# Patient Record
Sex: FEMALE | Race: BLACK OR AFRICAN AMERICAN | NOT HISPANIC OR LATINO | URBAN - METROPOLITAN AREA
[De-identification: names, ages, dates, MRNs, and addresses within clinical notes are randomized per-mention and may not be internally consistent; named-entity substitution may affect disease eponyms.]

---

## 2021-09-12 ENCOUNTER — EMERGENCY (EMERGENCY)
Facility: HOSPITAL | Age: 51
LOS: 1 days | Discharge: HOME | End: 2021-09-12
Attending: EMERGENCY MEDICINE | Admitting: EMERGENCY MEDICINE
Payer: COMMERCIAL

## 2021-09-12 VITALS
SYSTOLIC BLOOD PRESSURE: 114 MMHG | OXYGEN SATURATION: 100 % | HEART RATE: 79 BPM | TEMPERATURE: 98 F | RESPIRATION RATE: 18 BRPM | DIASTOLIC BLOOD PRESSURE: 77 MMHG

## 2021-09-12 VITALS
RESPIRATION RATE: 16 BRPM | DIASTOLIC BLOOD PRESSURE: 67 MMHG | HEART RATE: 70 BPM | OXYGEN SATURATION: 100 % | SYSTOLIC BLOOD PRESSURE: 112 MMHG | TEMPERATURE: 98 F

## 2021-09-12 LAB
ALBUMIN SERPL ELPH-MCNC: 4.6 G/DL — SIGNIFICANT CHANGE UP (ref 3.3–5)
ALP SERPL-CCNC: 78 U/L — SIGNIFICANT CHANGE UP (ref 40–120)
ALT FLD-CCNC: 17 U/L — SIGNIFICANT CHANGE UP (ref 4–33)
ANION GAP SERPL CALC-SCNC: 10 MMOL/L — SIGNIFICANT CHANGE UP (ref 7–14)
APPEARANCE UR: CLEAR — SIGNIFICANT CHANGE UP
AST SERPL-CCNC: 18 U/L — SIGNIFICANT CHANGE UP (ref 4–32)
BACTERIA # UR AUTO: NEGATIVE — SIGNIFICANT CHANGE UP
BASOPHILS # BLD AUTO: 0.02 K/UL — SIGNIFICANT CHANGE UP (ref 0–0.2)
BASOPHILS NFR BLD AUTO: 0.3 % — SIGNIFICANT CHANGE UP (ref 0–2)
BILIRUB SERPL-MCNC: 1.2 MG/DL — SIGNIFICANT CHANGE UP (ref 0.2–1.2)
BILIRUB UR-MCNC: NEGATIVE — SIGNIFICANT CHANGE UP
BUN SERPL-MCNC: 15 MG/DL — SIGNIFICANT CHANGE UP (ref 7–23)
CALCIUM SERPL-MCNC: 9.1 MG/DL — SIGNIFICANT CHANGE UP (ref 8.4–10.5)
CHLORIDE SERPL-SCNC: 101 MMOL/L — SIGNIFICANT CHANGE UP (ref 98–107)
CO2 SERPL-SCNC: 25 MMOL/L — SIGNIFICANT CHANGE UP (ref 22–31)
COLOR SPEC: SIGNIFICANT CHANGE UP
CREAT SERPL-MCNC: 0.52 MG/DL — SIGNIFICANT CHANGE UP (ref 0.5–1.3)
DIFF PNL FLD: NEGATIVE — SIGNIFICANT CHANGE UP
EOSINOPHIL # BLD AUTO: 0 K/UL — SIGNIFICANT CHANGE UP (ref 0–0.5)
EOSINOPHIL NFR BLD AUTO: 0 % — SIGNIFICANT CHANGE UP (ref 0–6)
GLUCOSE SERPL-MCNC: 124 MG/DL — HIGH (ref 70–99)
GLUCOSE UR QL: NEGATIVE — SIGNIFICANT CHANGE UP
HCT VFR BLD CALC: 37.9 % — SIGNIFICANT CHANGE UP (ref 34.5–45)
HGB BLD-MCNC: 13.1 G/DL — SIGNIFICANT CHANGE UP (ref 11.5–15.5)
IANC: 5.33 K/UL — SIGNIFICANT CHANGE UP (ref 1.5–8.5)
IMM GRANULOCYTES NFR BLD AUTO: 0.3 % — SIGNIFICANT CHANGE UP (ref 0–1.5)
KETONES UR-MCNC: ABNORMAL
LEUKOCYTE ESTERASE UR-ACNC: NEGATIVE — SIGNIFICANT CHANGE UP
LYMPHOCYTES # BLD AUTO: 0.79 K/UL — LOW (ref 1–3.3)
LYMPHOCYTES # BLD AUTO: 12.2 % — LOW (ref 13–44)
MCHC RBC-ENTMCNC: 30.8 PG — SIGNIFICANT CHANGE UP (ref 27–34)
MCHC RBC-ENTMCNC: 34.6 GM/DL — SIGNIFICANT CHANGE UP (ref 32–36)
MCV RBC AUTO: 89 FL — SIGNIFICANT CHANGE UP (ref 80–100)
MONOCYTES # BLD AUTO: 0.32 K/UL — SIGNIFICANT CHANGE UP (ref 0–0.9)
MONOCYTES NFR BLD AUTO: 4.9 % — SIGNIFICANT CHANGE UP (ref 2–14)
NEUTROPHILS # BLD AUTO: 5.33 K/UL — SIGNIFICANT CHANGE UP (ref 1.8–7.4)
NEUTROPHILS NFR BLD AUTO: 82.3 % — HIGH (ref 43–77)
NITRITE UR-MCNC: NEGATIVE — SIGNIFICANT CHANGE UP
NRBC # BLD: 0 /100 WBCS — SIGNIFICANT CHANGE UP
NRBC # FLD: 0 K/UL — SIGNIFICANT CHANGE UP
PH UR: 6.5 — SIGNIFICANT CHANGE UP (ref 5–8)
PLATELET # BLD AUTO: 187 K/UL — SIGNIFICANT CHANGE UP (ref 150–400)
POTASSIUM SERPL-MCNC: 3.7 MMOL/L — SIGNIFICANT CHANGE UP (ref 3.5–5.3)
POTASSIUM SERPL-SCNC: 3.7 MMOL/L — SIGNIFICANT CHANGE UP (ref 3.5–5.3)
PROT SERPL-MCNC: 7.4 G/DL — SIGNIFICANT CHANGE UP (ref 6–8.3)
PROT UR-MCNC: ABNORMAL
RBC # BLD: 4.26 M/UL — SIGNIFICANT CHANGE UP (ref 3.8–5.2)
RBC # FLD: 12.3 % — SIGNIFICANT CHANGE UP (ref 10.3–14.5)
RBC CASTS # UR COMP ASSIST: 2 /HPF — SIGNIFICANT CHANGE UP (ref 0–4)
SODIUM SERPL-SCNC: 136 MMOL/L — SIGNIFICANT CHANGE UP (ref 135–145)
SP GR SPEC: 1.02 — SIGNIFICANT CHANGE UP (ref 1–1.05)
UROBILINOGEN FLD QL: SIGNIFICANT CHANGE UP
WBC # BLD: 6.48 K/UL — SIGNIFICANT CHANGE UP (ref 3.8–10.5)
WBC # FLD AUTO: 6.48 K/UL — SIGNIFICANT CHANGE UP (ref 3.8–10.5)
WBC UR QL: 2 /HPF — SIGNIFICANT CHANGE UP (ref 0–5)

## 2021-09-12 PROCEDURE — 99284 EMERGENCY DEPT VISIT MOD MDM: CPT

## 2021-09-12 RX ORDER — MECLIZINE HCL 12.5 MG
25 TABLET ORAL ONCE
Refills: 0 | Status: COMPLETED | OUTPATIENT
Start: 2021-09-12 | End: 2021-09-12

## 2021-09-12 RX ORDER — SODIUM CHLORIDE 9 MG/ML
1000 INJECTION INTRAMUSCULAR; INTRAVENOUS; SUBCUTANEOUS ONCE
Refills: 0 | Status: COMPLETED | OUTPATIENT
Start: 2021-09-12 | End: 2021-09-12

## 2021-09-12 RX ORDER — MECLIZINE HCL 12.5 MG
1 TABLET ORAL
Qty: 21 | Refills: 0
Start: 2021-09-12 | End: 2021-09-18

## 2021-09-12 RX ADMIN — Medication 25 MILLIGRAM(S): at 19:02

## 2021-09-12 RX ADMIN — SODIUM CHLORIDE 2000 MILLILITER(S): 9 INJECTION INTRAMUSCULAR; INTRAVENOUS; SUBCUTANEOUS at 19:02

## 2021-09-12 RX ADMIN — Medication 25 MILLIGRAM(S): at 21:19

## 2021-09-12 NOTE — ED ADULT TRIAGE NOTE - CHIEF COMPLAINT QUOTE
pt coming with dizziness + nausea, started pt stated at approx 2Pm, pt denies weakness to upper or lower extrem. pt coming with dizziness + nausea, started pt stated at approx 2Pm, pt denies weakness to upper or lower extrem.     Fs= 112 by 12PM

## 2021-09-12 NOTE — ED PROVIDER NOTE - CLINICAL SUMMARY MEDICAL DECISION MAKING FREE TEXT BOX
pt p/w recurrent episode of room spinning dizziness with no focal neuro deficits. labs wnl, vss, neg perc criteria. no risk factors. pt hd stable, significant improvement for room spinning sensation. ekg non significant.

## 2021-09-12 NOTE — ED PROVIDER NOTE - PATIENT PORTAL LINK FT
You can access the FollowMyHealth Patient Portal offered by [facility] by registering at the following website: [email or web address]. By joining [employer]’s FollowMyHealth portal, you will also be able to view your health information using other applications (apps) compatible with our system.

## 2021-09-12 NOTE — ED PROVIDER NOTE - ENMT, MLM
Airway patent, Nasal mucosa clear. Mouth with normal mucosa. Throat has no vesicles, no oropharyngeal exudates and uvula is midline. wnl heel to shin, normal heel to shin.

## 2021-09-12 NOTE — ED PROVIDER NOTE - OBJECTIVE STATEMENT
50 y/o f with no pmhx p/w worsening dizziness, worse with movt of head, complains of room spinning dizziness with no focal neuro deficits. states has had similar symptoms in the past but not for multiple years.

## 2021-10-20 ENCOUNTER — APPOINTMENT (OUTPATIENT)
Dept: OTOLARYNGOLOGY | Facility: CLINIC | Age: 51
End: 2021-10-20
Payer: COMMERCIAL

## 2021-10-20 VITALS
BODY MASS INDEX: 27.7 KG/M2 | WEIGHT: 187 LBS | DIASTOLIC BLOOD PRESSURE: 65 MMHG | HEIGHT: 69 IN | TEMPERATURE: 97.5 F | HEART RATE: 79 BPM | SYSTOLIC BLOOD PRESSURE: 108 MMHG

## 2021-10-20 DIAGNOSIS — Z01.10 ENCOUNTER FOR EXAMINATION OF EARS AND HEARING W/OUT ABNORMAL FINDINGS: ICD-10-CM

## 2021-10-20 DIAGNOSIS — R42 DIZZINESS AND GIDDINESS: ICD-10-CM

## 2021-10-20 PROCEDURE — 92567 TYMPANOMETRY: CPT

## 2021-10-20 PROCEDURE — 92557 COMPREHENSIVE HEARING TEST: CPT

## 2021-10-20 PROCEDURE — 99203 OFFICE O/P NEW LOW 30 MIN: CPT | Mod: 25

## 2021-10-20 NOTE — ASSESSMENT
[FreeTextEntry1] : Patient had one vertigo episode 4 weeks ago, today she is feeling well \par \par Vertigo:\par -now resolved \par -Hearing Test performed to evaluate the extent of hearing loss and to explain pt's symptoms=wnl\par \par \par f/u prn

## 2021-10-20 NOTE — DATA REVIEWED
[de-identified] : Hearing Test performed to evaluate the extent of hearing loss and  to explain pt's symptoms\par today's hearing test was personally reviewed and revealed\par Hearing sensitivity is within normal limits, bilaterally\par

## 2021-10-20 NOTE — HISTORY OF PRESENT ILLNESS
[No] : patient does not have a  history of radiation therapy [de-identified] : 50 yo female\par Patient complains she had 1 episode of vertigo 4 weeks ago. She woke up one morning with the room spinning, went to the ED,was told she has vertigo and was discharged home. Today she is feeling well. States she had vertigo once 8 years ago. Otherwise doing well. Pt has no ear pain, ear drainage, hearing loss, tinnitus, nasal congestion, nasal discharge, epistaxis, sinus infections, facial pain, facial pressure, throat pain, dysphagia or fevers\par \par  [Vertigo] : vertigo [Dizziness] : dizziness [Hearing Loss] : no hearing loss [Eustachian Tube Dysfunction] : no eustachian tube dysfunction [Cholesteatoma] : no cholesteatoma [Early Onset Hearing Loss] : no early onset hearing loss [Stroke] : no stroke [Allergic Rhinitis] : no allergic rhinitis [Adenoidectomy] : no adenoidectomy [Allergies] : no allergies [Asthma] : no asthma [Hyperthyroidism] : no hyperthyroidism [Sialadenitis] : no sialadenitis [Hodgkin Disease] : no hodgkin disease [None] : No risk factors have been identified. [Graves Disease] : no graves disease [Thyroid Cancer] : no thyroid cancer

## 2021-10-20 NOTE — PHYSICAL EXAM
[Midline] : trachea located in midline position [Normal] : no rashes [Hearing Loss Right Only] : normal [Hearing Loss Left Only] : normal [Nystagmus] : ~T no ~M nystagmus was seen [Fukuda Step Test] : Fukuda Step Test was Negative [Robyn-Halljodyke] : [place]-Hallpike: Negative

## 2021-10-20 NOTE — END OF VISIT
[FreeTextEntry3] : I personally saw and examined GEOVANNI CALVO in detail. I spoke to TOMMY Street regarding the assessment and plan of care. I reviewed the above assessment and plan of care, and agree. I have made changes in changes in the body of the note where appropriate.I personally reviewed the HPI, PMH, FH, SH, ROS and medications/allergies. I have spoken to TOMMY Street regarding the history and have personally determined the assessment and plan of care, and documented this myself. I was present and participated in all key portions of the encounter and all procedures noted above. I have made changes in the body of the note where appropriate.\par \par Attesting Faculty: See Attending Signature Below \par \par \par  [Time Spent: ___ minutes] : I have spent [unfilled] minutes of time on the encounter.

## 2023-03-15 ENCOUNTER — EMERGENCY (EMERGENCY)
Facility: HOSPITAL | Age: 53
LOS: 1 days | Discharge: HOME | End: 2023-03-15
Attending: STUDENT IN AN ORGANIZED HEALTH CARE EDUCATION/TRAINING PROGRAM | Admitting: STUDENT IN AN ORGANIZED HEALTH CARE EDUCATION/TRAINING PROGRAM
Payer: COMMERCIAL

## 2023-03-15 VITALS
RESPIRATION RATE: 16 BRPM | DIASTOLIC BLOOD PRESSURE: 76 MMHG | HEART RATE: 88 BPM | OXYGEN SATURATION: 100 % | SYSTOLIC BLOOD PRESSURE: 124 MMHG

## 2023-03-15 VITALS
DIASTOLIC BLOOD PRESSURE: 70 MMHG | SYSTOLIC BLOOD PRESSURE: 141 MMHG | RESPIRATION RATE: 16 BRPM | TEMPERATURE: 98 F | OXYGEN SATURATION: 100 % | HEART RATE: 66 BPM

## 2023-03-15 LAB
ALBUMIN SERPL ELPH-MCNC: 4.5 G/DL — SIGNIFICANT CHANGE UP (ref 3.3–5)
ALP SERPL-CCNC: 81 U/L — SIGNIFICANT CHANGE UP (ref 40–120)
ALT FLD-CCNC: 23 U/L — SIGNIFICANT CHANGE UP (ref 4–33)
ANION GAP SERPL CALC-SCNC: 10 MMOL/L — SIGNIFICANT CHANGE UP (ref 7–14)
APPEARANCE UR: CLEAR — SIGNIFICANT CHANGE UP
AST SERPL-CCNC: 19 U/L — SIGNIFICANT CHANGE UP (ref 4–32)
BASOPHILS # BLD AUTO: 0.02 K/UL — SIGNIFICANT CHANGE UP (ref 0–0.2)
BASOPHILS NFR BLD AUTO: 0.5 % — SIGNIFICANT CHANGE UP (ref 0–2)
BILIRUB SERPL-MCNC: 1 MG/DL — SIGNIFICANT CHANGE UP (ref 0.2–1.2)
BILIRUB UR-MCNC: NEGATIVE — SIGNIFICANT CHANGE UP
BUN SERPL-MCNC: 12 MG/DL — SIGNIFICANT CHANGE UP (ref 7–23)
CALCIUM SERPL-MCNC: 9.4 MG/DL — SIGNIFICANT CHANGE UP (ref 8.4–10.5)
CHLORIDE SERPL-SCNC: 105 MMOL/L — SIGNIFICANT CHANGE UP (ref 98–107)
CO2 SERPL-SCNC: 27 MMOL/L — SIGNIFICANT CHANGE UP (ref 22–31)
COLOR SPEC: COLORLESS — SIGNIFICANT CHANGE UP
CREAT SERPL-MCNC: 0.63 MG/DL — SIGNIFICANT CHANGE UP (ref 0.5–1.3)
DIFF PNL FLD: NEGATIVE — SIGNIFICANT CHANGE UP
EGFR: 107 ML/MIN/1.73M2 — SIGNIFICANT CHANGE UP
EOSINOPHIL # BLD AUTO: 0.05 K/UL — SIGNIFICANT CHANGE UP (ref 0–0.5)
EOSINOPHIL NFR BLD AUTO: 1.2 % — SIGNIFICANT CHANGE UP (ref 0–6)
FLUAV AG NPH QL: SIGNIFICANT CHANGE UP
FLUBV AG NPH QL: SIGNIFICANT CHANGE UP
GLUCOSE SERPL-MCNC: 130 MG/DL — HIGH (ref 70–99)
GLUCOSE UR QL: NEGATIVE — SIGNIFICANT CHANGE UP
HCT VFR BLD CALC: 37.8 % — SIGNIFICANT CHANGE UP (ref 34.5–45)
HGB BLD-MCNC: 12.9 G/DL — SIGNIFICANT CHANGE UP (ref 11.5–15.5)
IANC: 2.12 K/UL — SIGNIFICANT CHANGE UP (ref 1.8–7.4)
IMM GRANULOCYTES NFR BLD AUTO: 0.2 % — SIGNIFICANT CHANGE UP (ref 0–0.9)
KETONES UR-MCNC: NEGATIVE — SIGNIFICANT CHANGE UP
LEUKOCYTE ESTERASE UR-ACNC: NEGATIVE — SIGNIFICANT CHANGE UP
LYMPHOCYTES # BLD AUTO: 1.64 K/UL — SIGNIFICANT CHANGE UP (ref 1–3.3)
LYMPHOCYTES # BLD AUTO: 40.4 % — SIGNIFICANT CHANGE UP (ref 13–44)
MCHC RBC-ENTMCNC: 30.4 PG — SIGNIFICANT CHANGE UP (ref 27–34)
MCHC RBC-ENTMCNC: 34.1 GM/DL — SIGNIFICANT CHANGE UP (ref 32–36)
MCV RBC AUTO: 88.9 FL — SIGNIFICANT CHANGE UP (ref 80–100)
MONOCYTES # BLD AUTO: 0.22 K/UL — SIGNIFICANT CHANGE UP (ref 0–0.9)
MONOCYTES NFR BLD AUTO: 5.4 % — SIGNIFICANT CHANGE UP (ref 2–14)
NEUTROPHILS # BLD AUTO: 2.12 K/UL — SIGNIFICANT CHANGE UP (ref 1.8–7.4)
NEUTROPHILS NFR BLD AUTO: 52.3 % — SIGNIFICANT CHANGE UP (ref 43–77)
NITRITE UR-MCNC: NEGATIVE — SIGNIFICANT CHANGE UP
NRBC # BLD: 0 /100 WBCS — SIGNIFICANT CHANGE UP (ref 0–0)
NRBC # FLD: 0 K/UL — SIGNIFICANT CHANGE UP (ref 0–0)
PH UR: 6.5 — SIGNIFICANT CHANGE UP (ref 5–8)
PLATELET # BLD AUTO: 173 K/UL — SIGNIFICANT CHANGE UP (ref 150–400)
POTASSIUM SERPL-MCNC: 4.2 MMOL/L — SIGNIFICANT CHANGE UP (ref 3.5–5.3)
POTASSIUM SERPL-SCNC: 4.2 MMOL/L — SIGNIFICANT CHANGE UP (ref 3.5–5.3)
PROT SERPL-MCNC: 7.4 G/DL — SIGNIFICANT CHANGE UP (ref 6–8.3)
PROT UR-MCNC: NEGATIVE — SIGNIFICANT CHANGE UP
RBC # BLD: 4.25 M/UL — SIGNIFICANT CHANGE UP (ref 3.8–5.2)
RBC # FLD: 13.2 % — SIGNIFICANT CHANGE UP (ref 10.3–14.5)
RSV RNA NPH QL NAA+NON-PROBE: SIGNIFICANT CHANGE UP
SARS-COV-2 RNA SPEC QL NAA+PROBE: SIGNIFICANT CHANGE UP
SODIUM SERPL-SCNC: 142 MMOL/L — SIGNIFICANT CHANGE UP (ref 135–145)
SP GR SPEC: 1.01 — LOW (ref 1.01–1.05)
TROPONIN T, HIGH SENSITIVITY RESULT: <6 NG/L — SIGNIFICANT CHANGE UP
TROPONIN T, HIGH SENSITIVITY RESULT: <6 NG/L — SIGNIFICANT CHANGE UP
UROBILINOGEN FLD QL: SIGNIFICANT CHANGE UP
WBC # BLD: 4.06 K/UL — SIGNIFICANT CHANGE UP (ref 3.8–10.5)
WBC # FLD AUTO: 4.06 K/UL — SIGNIFICANT CHANGE UP (ref 3.8–10.5)

## 2023-03-15 PROCEDURE — 99285 EMERGENCY DEPT VISIT HI MDM: CPT

## 2023-03-15 PROCEDURE — 71046 X-RAY EXAM CHEST 2 VIEWS: CPT | Mod: 26

## 2023-03-15 NOTE — ED PROVIDER NOTE - NSFOLLOWUPINSTRUCTIONS_ED_ALL_ED_FT
Avoid any strenuous activity.  Rest, drink plenty fluids.  Follow-up with your PCP and cardiologist within 1 week.  Return to ER if your chest pain persists, you have shortness of breath, dizziness, or any other worsening or concerning symptoms.

## 2023-03-15 NOTE — ED PROVIDER NOTE - PROGRESS NOTE DETAILS
Labs, chest x-ray unremarkable.  Shared decision making done with patient and offered CDU stay for further work-up.  Patient would rather follow-up outpatient.  Discharge lounge assisting with cardiology follow-up appointment.  Patient's vital signs stable, well-appearing, has had no chest pain while in ED.  Will DC home.  Strict return precautions given.

## 2023-03-15 NOTE — ED PROVIDER NOTE - PATIENT PORTAL LINK FT
You can access the FollowMyHealth Patient Portal offered by [facility] by registering at the following website: [email or web address]. By joining [employer]’s FollowMyHealth portal, you will also be able to view your health information using other applications (apps) compatible with our system.

## 2023-03-15 NOTE — ED ADULT NURSE NOTE - OBJECTIVE STATEMENT
A&Ox4. ambulatory. c/o intermittent chest pain. NAD. pt denies SOB, chest pain, dizziness, weakness, urinary symptoms, HA, n/v/d, fevers, chills. respirations are even and un labored. 20g placed to LAC. EKG obtained. safety precautions maintained.

## 2023-03-15 NOTE — ED PROVIDER NOTE - OBJECTIVE STATEMENT
52-year-old female no PMHx presents to ED complaining of left-sided chest pain x2 episodes which started around 945 this AM.  Patient states was just sitting on her computer when the pain started.  States only lasted a few seconds.  States had another episode that was more mild since.  Patient states around a month and a half ago had similar pain however this a.m. it felt worse.  Patient describes pain as "pulling sensation in the left chest which goes towards the back."  Patient denies any SOB, dizziness, headache, diaphoresis, leg swelling, N/V, abdominal pain, sick contacts.  Patient has not had any recent cardiac work-up done. Currently patient is asymptomatic.

## 2023-03-15 NOTE — ED ADULT TRIAGE NOTE - CHIEF COMPLAINT QUOTE
Pt c/o intermittent L sided chest pain radiating to L shoulder starting this AM. Pt states pain was worse this morning. Pt denies shortness of breath. Denies any known cardiac history.

## 2023-03-15 NOTE — ED PROVIDER NOTE - CLINICAL SUMMARY MEDICAL DECISION MAKING FREE TEXT BOX
52-year-old female no PMHx presents to ED complaining of left-sided chest pain x2 episodes which started around 945 this AM.  Patient states was just sitting on her computer when the pain started.  States only lasted a few seconds.  States had another episode that was more mild since.  Patient states around a month and a half ago had similar pain however this a.m. it felt worse.  Patient describes pain as "pulling sensation in the left chest which goes towards the back."  Patient denies any SOB, dizziness, headache, diaphoresis, leg swelling, N/V, abdominal pain, sick contacts.  Patient has not had any recent cardiac work-up done. Currently patient is asymptomatic.  EKG NSR, VSS, pt well appearing, symptoms non exertional. r/o ACS will check labs, troponin, cxr, likely outpatient cardiology follow up.

## 2023-03-15 NOTE — ED PROVIDER NOTE - ATTENDING APP SHARED VISIT CONTRIBUTION OF CARE
Nile Britton DO:  patient seen and evaluated with the PA.  I was present for key portions of the History & Physical, and I agree with the Impression & Plan. 51 yo f no reported pmh, non smoker, pw CP.  PW  bedside provides collateral.  States today had sudden episode of left-sided chest pain, sharp, rating to left back and arm.  Transient, self resolved.  Had a second episode in a few moments.  Also endorses some episode 1 month prior.  Denies inciting event.  Nonexertional, nonpleuritic.  Denies recent travel, sick contacts.  Denies SOB, N/V, paresthesias, diaphoresis.  Reports family history of heart failure,. Denies history of cardiac work-up.  Patient arrives HDS, well-appearing.  EKG NSR, nonischemic.  Very low suspicion ACS.  Wells criteria 0.  Do not suspect further catastrophe.  Patient seems to be reliable and can likely follow-up outpatient.

## 2023-03-17 ENCOUNTER — LABORATORY RESULT (OUTPATIENT)
Age: 53
End: 2023-03-17

## 2023-03-17 ENCOUNTER — APPOINTMENT (OUTPATIENT)
Dept: CARDIOLOGY | Facility: CLINIC | Age: 53
End: 2023-03-17
Payer: COMMERCIAL

## 2023-03-17 VITALS
SYSTOLIC BLOOD PRESSURE: 123 MMHG | DIASTOLIC BLOOD PRESSURE: 73 MMHG | BODY MASS INDEX: 29.47 KG/M2 | HEIGHT: 69 IN | HEART RATE: 74 BPM | WEIGHT: 199 LBS | OXYGEN SATURATION: 97 %

## 2023-03-17 PROCEDURE — 93000 ELECTROCARDIOGRAM COMPLETE: CPT

## 2023-03-17 PROCEDURE — 99205 OFFICE O/P NEW HI 60 MIN: CPT | Mod: 25

## 2023-03-17 NOTE — HISTORY OF PRESENT ILLNESS
[FreeTextEntry1] : 52F with PMHx of pre-diabetes presenting for follow up after recent ED visit for chest pain. \par \par Plan: \par Exercise stress test to evaluate her symptoms with exertion. \par Lipid panel, HbA1c, and CAC for further risk stratification \par \par RTC in 1 month after stess test

## 2023-03-20 NOTE — ASSESSMENT
[FreeTextEntry1] : 52F with PMHx prediabetes - diet-controlled for evaluation of intermittent chain pain with exertion. \par \par 1. Chest pain - \par patient is sedentary, unclear if symptoms are associated with exertion \par treadmill exercise stress test for evaluation of symptoms with exercise. \par hx of pre-diabetes - not on statin- CAC for risk stratification and need for statin therapy \par \par 2. HLD: \par - obtain lipid panel \par \par

## 2023-03-20 NOTE — REASON FOR VISIT
[Symptom and Test Evaluation] : symptom and test evaluation [FreeTextEntry1] : 52F with PMHx prediabetes - diet-controlled for evaluation of intermittent chain pain with exertion. \par Walks for exercise, maintains HH diet\par No chronic conditions \par No mediations \par No family history history of CAD in first degree relatives. \par \par Echo (2016): normal LV and RV function. No significant valvular abnormalities. \par Exercise Stress Test: no ECG evidence of ischemia

## 2023-03-22 ENCOUNTER — OUTPATIENT (OUTPATIENT)
Dept: OUTPATIENT SERVICES | Facility: HOSPITAL | Age: 53
LOS: 1 days | End: 2023-03-22
Payer: COMMERCIAL

## 2023-03-22 ENCOUNTER — APPOINTMENT (OUTPATIENT)
Dept: CV DIAGNOSTICS | Facility: HOSPITAL | Age: 53
End: 2023-03-22

## 2023-03-22 DIAGNOSIS — R94.39 ABNORMAL RESULT OF OTHER CARDIOVASCULAR FUNCTION STUDY: ICD-10-CM

## 2023-03-22 DIAGNOSIS — R07.9 CHEST PAIN, UNSPECIFIED: ICD-10-CM

## 2023-03-22 PROCEDURE — 93016 CV STRESS TEST SUPVJ ONLY: CPT | Mod: GC

## 2023-03-22 PROCEDURE — 93018 CV STRESS TEST I&R ONLY: CPT | Mod: GC

## 2023-03-24 ENCOUNTER — APPOINTMENT (OUTPATIENT)
Dept: CT IMAGING | Facility: CLINIC | Age: 53
End: 2023-03-24

## 2023-04-17 ENCOUNTER — APPOINTMENT (OUTPATIENT)
Dept: CT IMAGING | Facility: CLINIC | Age: 53
End: 2023-04-17

## 2023-04-17 ENCOUNTER — APPOINTMENT (OUTPATIENT)
Dept: CT IMAGING | Facility: CLINIC | Age: 53
End: 2023-04-17
Payer: COMMERCIAL

## 2023-04-17 PROCEDURE — 75574 CT ANGIO HRT W/3D IMAGE: CPT

## 2023-04-25 NOTE — ED PROVIDER NOTE - CROS ED MARK PERT SYS NEG
a/w CP at rest  -plan for cardiac cath for ischemic work up  - JOSE MARIA protocol pre hydration: NS 250cc IV bolus x1   -Consent obtained for cardiac catheterization w/ coronary angiogram and possible stent placement, with possible sedation and analgia. Pt is competent, has capacity, and understands risks and benefits of procedure. Risks and benefits discussed. Risk discussed included, but not limited to MI, stroke, mortality, major bleeding, arrythmia, or infection. All questions answered
cherelle all pertinent systems negative

## 2023-04-26 ENCOUNTER — APPOINTMENT (OUTPATIENT)
Dept: CARDIOLOGY | Facility: CLINIC | Age: 53
End: 2023-04-26
Payer: COMMERCIAL

## 2023-04-26 VITALS
OXYGEN SATURATION: 97 % | HEIGHT: 69 IN | DIASTOLIC BLOOD PRESSURE: 79 MMHG | TEMPERATURE: 98.7 F | SYSTOLIC BLOOD PRESSURE: 127 MMHG | BODY MASS INDEX: 29.62 KG/M2 | WEIGHT: 200 LBS | HEART RATE: 87 BPM

## 2023-04-26 PROCEDURE — 99215 OFFICE O/P EST HI 40 MIN: CPT

## 2023-04-26 NOTE — REASON FOR VISIT
[FreeTextEntry1] : 52F with PMHx pre-diabetes - diet-controlled for evaluation of intermittent chain pain with exertion. \par Walks for exercise, maintains HH diet\par No chronic conditions \par No mediations \par No family history history of CAD in first degree relatives. \par ECG treadmill test: 9 mets, 1 mm horizontal/upsloping STD in multiple leads. \par CTCA: minimal plaque in RCA, CAC 0\par \par

## 2023-04-26 NOTE — ASSESSMENT
[FreeTextEntry1] : 52F minimal CAD, pre-diabetes - diet-controlled for evaluation of intermittent chain pain with exertion.\par \par 1. Primary prevention\par - Patient has minimal plaque in RCA, CAC 0. \par - Low event rate for MACE in this population with CAC 0. \par - Discussed results, patient not agreeable to start statin. LDL 87, HbA1c 5.8 plan is to focus on lifestyle interventions and weight loss to address the prediabetes. Will follow up in clinic in 6 months.  \par \par 2. Pre-diabetes: \par Plan to add exercise 150 min moderate intensity\par \par \par RTC in 6 month

## 2023-04-28 ENCOUNTER — APPOINTMENT (OUTPATIENT)
Dept: CARDIOLOGY | Facility: CLINIC | Age: 53
End: 2023-04-28

## 2023-07-29 ENCOUNTER — EMERGENCY (EMERGENCY)
Facility: HOSPITAL | Age: 53
LOS: 1 days | Discharge: HOME | End: 2023-07-29
Attending: EMERGENCY MEDICINE | Admitting: EMERGENCY MEDICINE
Payer: COMMERCIAL

## 2023-07-29 VITALS
HEART RATE: 81 BPM | DIASTOLIC BLOOD PRESSURE: 85 MMHG | SYSTOLIC BLOOD PRESSURE: 150 MMHG | RESPIRATION RATE: 16 BRPM | TEMPERATURE: 99 F | OXYGEN SATURATION: 100 %

## 2023-07-29 LAB
ALBUMIN SERPL ELPH-MCNC: 4.8 G/DL — SIGNIFICANT CHANGE UP (ref 3.3–5)
ALP SERPL-CCNC: 82 U/L — SIGNIFICANT CHANGE UP (ref 40–120)
ALT FLD-CCNC: 17 U/L — SIGNIFICANT CHANGE UP (ref 4–33)
ANION GAP SERPL CALC-SCNC: 18 MMOL/L — HIGH (ref 7–14)
AST SERPL-CCNC: 22 U/L — SIGNIFICANT CHANGE UP (ref 4–32)
BASOPHILS # BLD AUTO: 0.02 K/UL — SIGNIFICANT CHANGE UP (ref 0–0.2)
BASOPHILS NFR BLD AUTO: 0.4 % — SIGNIFICANT CHANGE UP (ref 0–2)
BILIRUB SERPL-MCNC: 1.4 MG/DL — HIGH (ref 0.2–1.2)
BUN SERPL-MCNC: 11 MG/DL — SIGNIFICANT CHANGE UP (ref 7–23)
CALCIUM SERPL-MCNC: 9.5 MG/DL — SIGNIFICANT CHANGE UP (ref 8.4–10.5)
CHLORIDE SERPL-SCNC: 102 MMOL/L — SIGNIFICANT CHANGE UP (ref 98–107)
CO2 SERPL-SCNC: 23 MMOL/L — SIGNIFICANT CHANGE UP (ref 22–31)
CREAT SERPL-MCNC: 0.74 MG/DL — SIGNIFICANT CHANGE UP (ref 0.5–1.3)
EGFR: 97 ML/MIN/1.73M2 — SIGNIFICANT CHANGE UP
EOSINOPHIL # BLD AUTO: 0.07 K/UL — SIGNIFICANT CHANGE UP (ref 0–0.5)
EOSINOPHIL NFR BLD AUTO: 1.5 % — SIGNIFICANT CHANGE UP (ref 0–6)
GLUCOSE SERPL-MCNC: 121 MG/DL — HIGH (ref 70–99)
HCT VFR BLD CALC: 38.2 % — SIGNIFICANT CHANGE UP (ref 34.5–45)
HGB BLD-MCNC: 13.1 G/DL — SIGNIFICANT CHANGE UP (ref 11.5–15.5)
IANC: 2.25 K/UL — SIGNIFICANT CHANGE UP (ref 1.8–7.4)
IMM GRANULOCYTES NFR BLD AUTO: 0.4 % — SIGNIFICANT CHANGE UP (ref 0–0.9)
LYMPHOCYTES # BLD AUTO: 2.05 K/UL — SIGNIFICANT CHANGE UP (ref 1–3.3)
LYMPHOCYTES # BLD AUTO: 43.1 % — SIGNIFICANT CHANGE UP (ref 13–44)
MCHC RBC-ENTMCNC: 30.1 PG — SIGNIFICANT CHANGE UP (ref 27–34)
MCHC RBC-ENTMCNC: 34.3 GM/DL — SIGNIFICANT CHANGE UP (ref 32–36)
MCV RBC AUTO: 87.8 FL — SIGNIFICANT CHANGE UP (ref 80–100)
MONOCYTES # BLD AUTO: 0.35 K/UL — SIGNIFICANT CHANGE UP (ref 0–0.9)
MONOCYTES NFR BLD AUTO: 7.4 % — SIGNIFICANT CHANGE UP (ref 2–14)
NEUTROPHILS # BLD AUTO: 2.25 K/UL — SIGNIFICANT CHANGE UP (ref 1.8–7.4)
NEUTROPHILS NFR BLD AUTO: 47.2 % — SIGNIFICANT CHANGE UP (ref 43–77)
NRBC # BLD: 0 /100 WBCS — SIGNIFICANT CHANGE UP (ref 0–0)
NRBC # FLD: 0 K/UL — SIGNIFICANT CHANGE UP (ref 0–0)
PLATELET # BLD AUTO: 238 K/UL — SIGNIFICANT CHANGE UP (ref 150–400)
POTASSIUM SERPL-MCNC: 3.5 MMOL/L — SIGNIFICANT CHANGE UP (ref 3.5–5.3)
POTASSIUM SERPL-SCNC: 3.5 MMOL/L — SIGNIFICANT CHANGE UP (ref 3.5–5.3)
PROT SERPL-MCNC: 8.2 G/DL — SIGNIFICANT CHANGE UP (ref 6–8.3)
RBC # BLD: 4.35 M/UL — SIGNIFICANT CHANGE UP (ref 3.8–5.2)
RBC # FLD: 12.5 % — SIGNIFICANT CHANGE UP (ref 10.3–14.5)
SODIUM SERPL-SCNC: 143 MMOL/L — SIGNIFICANT CHANGE UP (ref 135–145)
TSH SERPL-MCNC: 0.89 UIU/ML — SIGNIFICANT CHANGE UP (ref 0.27–4.2)
WBC # BLD: 4.76 K/UL — SIGNIFICANT CHANGE UP (ref 3.8–10.5)
WBC # FLD AUTO: 4.76 K/UL — SIGNIFICANT CHANGE UP (ref 3.8–10.5)

## 2023-07-29 PROCEDURE — 93010 ELECTROCARDIOGRAM REPORT: CPT

## 2023-07-29 PROCEDURE — 99284 EMERGENCY DEPT VISIT MOD MDM: CPT

## 2023-07-29 NOTE — ED ADULT NURSE NOTE - OBJECTIVE STATEMENT
Pt A+Ox4.  Pt states she has not been able to sleep for 2 days.  Pt states she is able to fall asleep but not stay asleep.  Pt reports only sleeping 5 hrs in 2 days.  Pt states she feels dizzy and generalized weakness.  IV placed Left AC#20.  Pt awaiting lab results.  Pt states she tested positive for covid on tuesday.

## 2023-07-29 NOTE — ED PROVIDER NOTE - NSFOLLOWUPCLINICS_GEN_ALL_ED_FT
[facility]  Psychiatry  [address] [place] [address]  Phone: [phone]  Fax:

## 2023-07-29 NOTE — ED PROVIDER NOTE - OBJECTIVE STATEMENT
Patient is a 53-year-old female with a past medical history of vertigo who presents with insomnia for the past 2 days.  She reports no triggering events but since 2 days ago has only been able to sleep for 5 hours.  reports she has been more apologetic and has had slowed speech since two days ago. She also reports a headache that started today, she has taken 2 of Tylenol and it has since subsided. She reports no triggering events. She has been COVID+ since Monday.     She denies chest pain, shortness of breath, abdominal pain, nausea, vomiting, diarrhea, dysuria, hematuria, numbness, tingling, changes in vision, or focal weakness. Patient is a 53-year-old female with a past medical history of vertigo who presents with insomnia for the past 2 days.  She reports no triggering events but since 2 days ago has only been able to sleep for 5 hours.  reports she has been more apologetic and has had slowed speech since two days ago. She also reports a headache that started today, she has taken 2 of Tylenol and it has since subsided. She reports no triggering events. She has been COVID+ since Monday. She has not tried any medication to help her sleep.     She denies chest pain, shortness of breath, abdominal pain, nausea, vomiting, diarrhea, dysuria, hematuria, numbness, tingling, changes in vision, or focal weakness. She denies suicidal or homicidal ideation.

## 2023-07-29 NOTE — ED PROVIDER NOTE - NSFOLLOWUPINSTRUCTIONS_ED_ALL_ED_FT
-Please follow up with your primary care doctor to manage your quality/quantity of sleep long term. Please contact psychiatry if you have persistent problems with sleep or any changes in mood.     -Take 50mg of Benadryl prior to bedtime.

## 2023-07-29 NOTE — ED PROVIDER NOTE - CLINICAL SUMMARY MEDICAL DECISION MAKING FREE TEXT BOX
The patient is a 53-year-old with a past medical history of vertigo who presents with a 2-day history of insomnia.  She has no chest pain, obtaining EKG due to extreme fatigue but low likelihood of ACS.  Obtaining creatinine and AST/ALT due to fatigue symptoms, low likelihood due to no abdominal or urinary symptoms/history.  CBC obtained in order to evaluate for possible anemia also low risk. Also obtaining TSH to evaluate for hypothyroidism also low risk due to no other symptoms. Most likely depressive in nature as she is at increased risk due to recent Covid diagnosis and presents with slowed speech and is more apologetic than normal. Will confirm outpatient follow-up for support. The patient is a 53-year-old with a past medical history of vertigo who presents with a 2-day history of insomnia.  She has no chest pain, obtaining EKG due to extreme fatigue but low likelihood of ACS.  Obtaining creatinine and AST/ALT due to fatigue symptoms, low likelihood due to no abdominal or urinary symptoms/history.  CBC obtained in order to evaluate for possible anemia also low risk. Also obtaining TSH to evaluate for hypothyroidism also low risk due to no other symptoms. Most likely depressive in nature as she is at increased risk due to recent Covid diagnosis and presents with slowed speech and is more apologetic than normal. Will confirm outpatient follow-up for support.  Adele: Patient is 53-year-old female who presents to the emergency department with 2 days of insomnia.  Patient somewhat disorganized in the way she is communicating but denies SI or HI.  Patient noted that she is speaking slower than usual and has feels weak and tired.  Patient states that when she tries to go to sleep there are she cannot shut off the thoughts in her head.  Patient denies depression.  Patient was diagnosed with COVID a few days ago.  Will evaluate for causes of weakness and slowness such as hypoglycemia hypothyroidism and anemia if all that is negative will ensure follow-up with her regular doctor and will offer psychiatric services as well.  Patient should be sent home on Benadryl for sleep if all else negative.

## 2023-07-29 NOTE — ED PROVIDER NOTE - ATTENDING CONTRIBUTION TO CARE
I performed a history and physical exam of the patient and discussed their management with the resident and /or advanced care provider. I reviewed the resident and /or ACP's note and agree with the documented findings and plan of care. My medical decision making and observations are found above.  Lungs clear, abd soft, nl speech, nl giat,

## 2023-07-29 NOTE — ED ADULT TRIAGE NOTE - CHIEF COMPLAINT QUOTE
pt c/o difficulty sleeping for past 2 days and now having headache which she took tylenol  for but it did not go completely away

## 2023-07-29 NOTE — ED PROVIDER NOTE - PATIENT PORTAL LINK FT
You can access the FollowMyHealth Patient Portal offered by [facility] by registering at the following website: [email or web address]. By joining [employer]’s FollowMyHealth portal, you will also be able to view your health information using other applications (apps) compatible with our system.

## 2023-07-29 NOTE — ED PROVIDER NOTE - PHYSICAL EXAMINATION
Physical Exam:  Gen: NAD, non-toxic appearing  Head: NCAT  HEENT: Normal conjunctiva, trachea midline, moist mucous membranes  Lung: CTAB, no respiratory distress, no wheezes/rhonchi/rales B/L, speaking in full sentences  CV: RRR, no murmurs, rubs or gallops  Abd: Soft, NT, ND, no guarding, rigidity, rebound tenderness  MSK: No visible deformities, moves all four extremities   Neuro: No focal motor deficits  Skin: Warm, well perfused, no visible rashes, no leg swelling  Psych: Hesitant to answer questions, apologetic, slow speech, flattened affect

## 2023-07-30 ENCOUNTER — INPATIENT (INPATIENT)
Facility: HOSPITAL | Age: 53
LOS: 4 days | Discharge: HOME | End: 2023-08-04
Attending: HOSPITALIST | Admitting: HOSPITALIST
Payer: COMMERCIAL

## 2023-07-30 VITALS
RESPIRATION RATE: 24 BRPM | DIASTOLIC BLOOD PRESSURE: 76 MMHG | HEART RATE: 92 BPM | OXYGEN SATURATION: 98 % | SYSTOLIC BLOOD PRESSURE: 145 MMHG | TEMPERATURE: 99 F

## 2023-07-30 DIAGNOSIS — R41.0 DISORIENTATION, UNSPECIFIED: ICD-10-CM

## 2023-07-30 DIAGNOSIS — U07.1 COVID-19: ICD-10-CM

## 2023-07-30 DIAGNOSIS — Z29.9 ENCOUNTER FOR PROPHYLACTIC MEASURES, UNSPECIFIED: ICD-10-CM

## 2023-07-30 DIAGNOSIS — R73.9 HYPERGLYCEMIA, UNSPECIFIED: ICD-10-CM

## 2023-07-30 LAB
ALBUMIN SERPL ELPH-MCNC: 4.7 G/DL — SIGNIFICANT CHANGE UP (ref 3.3–5)
ALP SERPL-CCNC: 76 U/L — SIGNIFICANT CHANGE UP (ref 40–120)
ALT FLD-CCNC: 17 U/L — SIGNIFICANT CHANGE UP (ref 4–33)
ANION GAP SERPL CALC-SCNC: 21 MMOL/L — HIGH (ref 7–14)
APAP SERPL-MCNC: <10 UG/ML — LOW (ref 15–25)
AST SERPL-CCNC: 22 U/L — SIGNIFICANT CHANGE UP (ref 4–32)
B PERT DNA SPEC QL NAA+PROBE: SIGNIFICANT CHANGE UP
B PERT+PARAPERT DNA PNL SPEC NAA+PROBE: SIGNIFICANT CHANGE UP
BASE EXCESS BLDV CALC-SCNC: -0.2 MMOL/L — SIGNIFICANT CHANGE UP (ref -2–3)
BASOPHILS # BLD AUTO: 0.02 K/UL — SIGNIFICANT CHANGE UP (ref 0–0.2)
BASOPHILS NFR BLD AUTO: 0.3 % — SIGNIFICANT CHANGE UP (ref 0–2)
BILIRUB SERPL-MCNC: 1.4 MG/DL — HIGH (ref 0.2–1.2)
BORDETELLA PARAPERTUSSIS (RAPRVP): SIGNIFICANT CHANGE UP
BUN SERPL-MCNC: 13 MG/DL — SIGNIFICANT CHANGE UP (ref 7–23)
C PNEUM DNA SPEC QL NAA+PROBE: SIGNIFICANT CHANGE UP
CA-I SERPL-SCNC: 1.2 MMOL/L — SIGNIFICANT CHANGE UP (ref 1.15–1.33)
CALCIUM SERPL-MCNC: 9.7 MG/DL — SIGNIFICANT CHANGE UP (ref 8.4–10.5)
CHLORIDE BLDV-SCNC: 105 MMOL/L — SIGNIFICANT CHANGE UP (ref 96–108)
CHLORIDE SERPL-SCNC: 103 MMOL/L — SIGNIFICANT CHANGE UP (ref 98–107)
CO2 BLDV-SCNC: 25.1 MMOL/L — SIGNIFICANT CHANGE UP (ref 22–26)
CO2 SERPL-SCNC: 18 MMOL/L — LOW (ref 22–31)
CREAT SERPL-MCNC: 0.72 MG/DL — SIGNIFICANT CHANGE UP (ref 0.5–1.3)
EGFR: 101 ML/MIN/1.73M2 — SIGNIFICANT CHANGE UP
EOSINOPHIL # BLD AUTO: 0 K/UL — SIGNIFICANT CHANGE UP (ref 0–0.5)
EOSINOPHIL NFR BLD AUTO: 0 % — SIGNIFICANT CHANGE UP (ref 0–6)
ETHANOL SERPL-MCNC: <10 MG/DL — SIGNIFICANT CHANGE UP
FLUAV SUBTYP SPEC NAA+PROBE: SIGNIFICANT CHANGE UP
FLUBV RNA SPEC QL NAA+PROBE: SIGNIFICANT CHANGE UP
GAS PNL BLDV: 138 MMOL/L — SIGNIFICANT CHANGE UP (ref 136–145)
GAS PNL BLDV: SIGNIFICANT CHANGE UP
GLUCOSE BLDV-MCNC: 216 MG/DL — HIGH (ref 70–99)
GLUCOSE SERPL-MCNC: 254 MG/DL — HIGH (ref 70–99)
HADV DNA SPEC QL NAA+PROBE: SIGNIFICANT CHANGE UP
HCG SERPL-ACNC: 2 MIU/ML — SIGNIFICANT CHANGE UP
HCO3 BLDV-SCNC: 24 MMOL/L — SIGNIFICANT CHANGE UP (ref 22–29)
HCOV 229E RNA SPEC QL NAA+PROBE: SIGNIFICANT CHANGE UP
HCOV HKU1 RNA SPEC QL NAA+PROBE: SIGNIFICANT CHANGE UP
HCOV NL63 RNA SPEC QL NAA+PROBE: SIGNIFICANT CHANGE UP
HCOV OC43 RNA SPEC QL NAA+PROBE: SIGNIFICANT CHANGE UP
HCT VFR BLD CALC: 36.8 % — SIGNIFICANT CHANGE UP (ref 34.5–45)
HCT VFR BLDA CALC: 38 % — SIGNIFICANT CHANGE UP (ref 34.5–46.5)
HGB BLD CALC-MCNC: 12.7 G/DL — SIGNIFICANT CHANGE UP (ref 11.7–16.1)
HGB BLD-MCNC: 12.7 G/DL — SIGNIFICANT CHANGE UP (ref 11.5–15.5)
HMPV RNA SPEC QL NAA+PROBE: SIGNIFICANT CHANGE UP
HPIV1 RNA SPEC QL NAA+PROBE: SIGNIFICANT CHANGE UP
HPIV2 RNA SPEC QL NAA+PROBE: SIGNIFICANT CHANGE UP
HPIV3 RNA SPEC QL NAA+PROBE: SIGNIFICANT CHANGE UP
HPIV4 RNA SPEC QL NAA+PROBE: SIGNIFICANT CHANGE UP
IANC: 5.66 K/UL — SIGNIFICANT CHANGE UP (ref 1.8–7.4)
IMM GRANULOCYTES NFR BLD AUTO: 0.3 % — SIGNIFICANT CHANGE UP (ref 0–0.9)
LACTATE BLDV-MCNC: 2.9 MMOL/L — HIGH (ref 0.5–2)
LYMPHOCYTES # BLD AUTO: 0.79 K/UL — LOW (ref 1–3.3)
LYMPHOCYTES # BLD AUTO: 11.7 % — LOW (ref 13–44)
M PNEUMO DNA SPEC QL NAA+PROBE: SIGNIFICANT CHANGE UP
MCHC RBC-ENTMCNC: 30 PG — SIGNIFICANT CHANGE UP (ref 27–34)
MCHC RBC-ENTMCNC: 34.5 GM/DL — SIGNIFICANT CHANGE UP (ref 32–36)
MCV RBC AUTO: 87 FL — SIGNIFICANT CHANGE UP (ref 80–100)
MONOCYTES # BLD AUTO: 0.28 K/UL — SIGNIFICANT CHANGE UP (ref 0–0.9)
MONOCYTES NFR BLD AUTO: 4.1 % — SIGNIFICANT CHANGE UP (ref 2–14)
NEUTROPHILS # BLD AUTO: 5.66 K/UL — SIGNIFICANT CHANGE UP (ref 1.8–7.4)
NEUTROPHILS NFR BLD AUTO: 83.6 % — HIGH (ref 43–77)
NRBC # BLD: 0 /100 WBCS — SIGNIFICANT CHANGE UP (ref 0–0)
NRBC # FLD: 0 K/UL — SIGNIFICANT CHANGE UP (ref 0–0)
PCO2 BLDV: 37 MMHG — LOW (ref 39–52)
PH BLDV: 7.42 — SIGNIFICANT CHANGE UP (ref 7.32–7.43)
PLATELET # BLD AUTO: 244 K/UL — SIGNIFICANT CHANGE UP (ref 150–400)
PO2 BLDV: 36 MMHG — SIGNIFICANT CHANGE UP (ref 25–45)
POTASSIUM BLDV-SCNC: 3.3 MMOL/L — LOW (ref 3.5–5.1)
POTASSIUM SERPL-MCNC: 3.2 MMOL/L — LOW (ref 3.5–5.3)
POTASSIUM SERPL-SCNC: 3.2 MMOL/L — LOW (ref 3.5–5.3)
PROT SERPL-MCNC: 8.4 G/DL — HIGH (ref 6–8.3)
RAPID RVP RESULT: DETECTED
RBC # BLD: 4.23 M/UL — SIGNIFICANT CHANGE UP (ref 3.8–5.2)
RBC # FLD: 12.5 % — SIGNIFICANT CHANGE UP (ref 10.3–14.5)
RSV RNA SPEC QL NAA+PROBE: SIGNIFICANT CHANGE UP
RV+EV RNA SPEC QL NAA+PROBE: SIGNIFICANT CHANGE UP
SALICYLATES SERPL-MCNC: <0.3 MG/DL — LOW (ref 15–30)
SAO2 % BLDV: 66.6 % — LOW (ref 67–88)
SARS-COV-2 RNA SPEC QL NAA+PROBE: DETECTED
SODIUM SERPL-SCNC: 142 MMOL/L — SIGNIFICANT CHANGE UP (ref 135–145)
TOXICOLOGY SCREEN, DRUGS OF ABUSE, SERUM RESULT: SIGNIFICANT CHANGE UP
TSH SERPL-MCNC: 0.48 UIU/ML — SIGNIFICANT CHANGE UP (ref 0.27–4.2)
WBC # BLD: 6.77 K/UL — SIGNIFICANT CHANGE UP (ref 3.8–10.5)
WBC # FLD AUTO: 6.77 K/UL — SIGNIFICANT CHANGE UP (ref 3.8–10.5)

## 2023-07-30 PROCEDURE — 70450 CT HEAD/BRAIN W/O DYE: CPT | Mod: 26,ME

## 2023-07-30 PROCEDURE — 99285 EMERGENCY DEPT VISIT HI MDM: CPT

## 2023-07-30 PROCEDURE — 71046 X-RAY EXAM CHEST 2 VIEWS: CPT | Mod: 26

## 2023-07-30 PROCEDURE — 99223 1ST HOSP IP/OBS HIGH 75: CPT | Mod: GC

## 2023-07-30 PROCEDURE — G1004: CPT

## 2023-07-30 RX ORDER — HALOPERIDOL DECANOATE 100 MG/ML
5 INJECTION INTRAMUSCULAR ONCE
Refills: 0 | Status: COMPLETED | OUTPATIENT
Start: 2023-07-30 | End: 2023-07-30

## 2023-07-30 RX ORDER — SODIUM CHLORIDE 9 MG/ML
1000 INJECTION INTRAMUSCULAR; INTRAVENOUS; SUBCUTANEOUS ONCE
Refills: 0 | Status: COMPLETED | OUTPATIENT
Start: 2023-07-30 | End: 2023-07-30

## 2023-07-30 RX ADMIN — Medication 2 MILLIGRAM(S): at 22:31

## 2023-07-30 RX ADMIN — SODIUM CHLORIDE 1000 MILLILITER(S): 9 INJECTION INTRAMUSCULAR; INTRAVENOUS; SUBCUTANEOUS at 18:21

## 2023-07-30 RX ADMIN — HALOPERIDOL DECANOATE 5 MILLIGRAM(S): 100 INJECTION INTRAMUSCULAR at 15:19

## 2023-07-30 RX ADMIN — Medication 2 MILLIGRAM(S): at 16:16

## 2023-07-30 NOTE — H&P ADULT - PROBLEM SELECTOR PLAN 1
Pt with several days of altered behavior, fluctuating from talking aggressively to slower speech. appears to have difficulty finding words.  -CTH negative  -no electrolyte abnormalities   -pt without previously psych hx  - taking bromphen  for the past several days  -psych consult, avoid anticholinergic meds, haloperidol prn  -neuro consult, EEG ordered, vitamin B1, B6, B12, D, E, folate, TSH, free T4, ESR/CRP  -if EEG unremarkable with continued altered behavior, consider MRI   -f/u UA  -on 1:1 Presenting with insomnia, bizarre behavior, intermittent agitation, disorientation, incoherent/nonsensical speech  Unclear etiology at this time. The patient does not have prior psych/neuro history. She was recently on benadryl and brompheniramine which are both antihistamines. Anticholinergic/medication induced delirium is a possibility as well as an unspecified mood disorder. She also is COVID positive which could potentially contribute.  Per neuro encephalitis less likely due to sx <1 week  - consult appreciated. Possible mood disorder but with rapid decline at this time  -hold all antihistamines  -bladder scan, monitor for urinary retention. Patient however has been voiding in ED  -CTH neg  -ativan/haldol PRNs  -neuro consult, EEG ordered, vitamin B1, B6, B12, D, E, folate, syphilis, ammonia, TSH, (nl) free T4, ESR/CRP  -lyme Ab, [place], hep panel, HIV, EBV, CMV  -discuss with neuro if LP recommended or MRI  -ID c/s in AM to r/o infectious etiologies  -if EEG unremarkable with continued altered behavior, consider MRI   -f/u UA, UCx  -on 1:1 given intermittent agitation

## 2023-07-30 NOTE — CONSULT NOTE ADULT - ATTENDING COMMENTS
Ms. Sutton is a 53 year old right handed  woman with PMHx of recent diagnosed of COVID 19 infection who was brought to [facility] ER due to the Altered Mental Status and insomnia.   During my assessment, patient was cooperative and no focal deficit.  Etiology of AMS is  uncertain but due to the metabolic encephalopathy versus delirium due to the infection and insomnia. Clinically I have less suspicious for ischemic stroke and seizure. Patient would benefit from further work up to rule out treatable etiologies including MRI brain,  EEG, B12, folate, TSH, RPR.  I discussed the diagnosis, treatment plan with the patient.  All questions and concerns were addressed. The patient demonstrated good understanding of the treatment plan.  If you have any further questions, please do not hesitate to contact our team.  Thank you for allowing us to participate in this patient care.

## 2023-07-30 NOTE — H&P ADULT - PROBLEM SELECTOR PLAN 2
Pt tested positive for COVID on 7/25  -CXR negative  -without respiratory complaints Pt tested positive for COVID on 7/25  -CXR negative  -without respiratory complaints  -ID consult in the AM Pt tested positive for COVID on 7/25. Unable to determine if symptomatic. Not hypoxic, CXR clear  -no indication for remdesivir, dex given no hypoxia  -lovenox  -D-dimer, procalc in AM  -CXR negative  -without respiratory complaints  -ID consult in the AM

## 2023-07-30 NOTE — H&P ADULT - ASSESSMENT
Patient is a 53-year-old female with a past medical history of vertigo who presents for altered behavior over the past 3 days.

## 2023-07-30 NOTE — ED BEHAVIORAL HEALTH ASSESSMENT NOTE - GENERAL APPEARANCE
Assumed care of patient.   Pt resting comfortably, denies complaints     [facility], RN  01/11/22 4600 No deformities present

## 2023-07-30 NOTE — ED BEHAVIORAL HEALTH ASSESSMENT NOTE - DESCRIPTION
vertigo Vital Signs Last 24 Hrs  T(C): 37.1 (30 Jul 2023 12:52), Max: 37.2 (29 Jul 2023 18:19)  T(F): 98.8 (30 Jul 2023 12:52), Max: 98.9 (29 Jul 2023 18:19)  HR: 92 (30 Jul 2023 12:52) (81 - 92)  BP: 145/76 (30 Jul 2023 12:52) (145/76 - 150/85)  BP(mean): --  RR: 24 (30 Jul 2023 12:52) (16 - 24)  SpO2: 98% (30 Jul 2023 12:52) (98% - 100%)    Parameters below as of 30 Jul 2023 12:52  Patient On (Oxygen Delivery Method): room air lives with  and adult children

## 2023-07-30 NOTE — H&P ADULT - ATTENDING COMMENTS
I have personally seen, examined, and participated in the care of this patient.  I have reviewed all pertinent clinical information, including history, physical exam, plan, and the resident's note and agree except as noted.    #Acute encephalopathy  Unclear etiology. Acute decline over the last several days in setting of antihistamine use (50mg benadryl night prior and 100mg in AM and brompheniramine) though unclear if reached an excess amount of this. Possible mood disorder per psych and also with COVID  -constant observation  -haldol/ativan PRN  -EEG per neuro  -MRI if EEG unremarkable  -f/u neuro if need for LP to r/o encephalitis. At this time sx less than one week making encephalitis less likely per neuro. No meningeal signs at this time or fever/leukocytosis. No neck stiffness or issues with ROM. Patient denies pain. Low suspicion for meningitis at this time  -neuro consult, EEG ordered, vitamin B1, B6, B12, D, E, folate, syphilis, ammonia, TSH, (nl) free T4, ESR/CRP  -lyme Ab, [place], hep panel, HIV, EBV, CMV  -UA, UCx  -bladder scan  -ID c/s    #COVID  -D-dimer, procal  -lovenox  -CXR clear  -no remdesivir or dex    #Elevated blood glucose  ISS, A1C    #ppx  Lovenox  Dysphagia screen when MS improves. Maint IVF if fails I have personally seen, examined, and participated in the care of this patient.  I have reviewed all pertinent clinical information, including history, physical exam, plan, and the resident's note and agree except as noted.    Seen and evaluated by me at 1130PM 7/30    #Acute encephalopathy  Unclear etiology. Acute decline over the last several days in setting of antihistamine use (50mg benadryl night prior and 100mg in AM and brompheniramine) though unclear if reached an excess amount of this. Possible mood disorder per psych and also with COVID  -constant observation  -haldol/ativan PRN  -EEG per neuro  -MRI if EEG unremarkable  -f/u neuro if need for LP to r/o encephalitis. At this time sx less than one week making encephalitis less likely per neuro. No meningeal signs at this time or fever/leukocytosis. No neck stiffness or issues with ROM. Patient denies pain. Low suspicion for meningitis at this time  -neuro consult, EEG ordered, vitamin B1, B6, B12, D, E, folate, syphilis, ammonia, TSH, (nl) free T4, ESR/CRP  -lyme Ab, [place], hep panel, HIV, EBV, CMV  -UA, UCx  -bladder scan  -ID c/s    #COVID  -D-dimer, procal  -lovenox  -CXR clear  -no remdesivir or dex    #Elevated blood glucose  ISS, A1C    #ppx  Lovenox  Dysphagia screen when MS improves. Maint IVF if fails

## 2023-07-30 NOTE — ED PROVIDER NOTE - CLINICAL SUMMARY MEDICAL DECISION MAKING FREE TEXT BOX
Labs, Urine Tox/UA, EKG, CT head, Chest X-ray    New onset delirium. Psychiatric , neurology & ID  consultation.  Admit to medicine 54 y/o F , no known medical/ psychiatric illness   New onset delirium. Psychiatric , neurology & ID  consultation.  Admit to medicine  Labs, Urine Tox/UA, EKG, CT head, Chest X-ray.  CL Psychiatric team to follow.

## 2023-07-30 NOTE — ED ADULT NURSE NOTE - OBJECTIVE STATEMENT
PT diagnosed with COVID 7/25, currently asymptomatic. Pt DC from ED yesterday with chief complaint of insomnia. Pt now decompensating as per . Pt unable to dress herself or take medications, not currently able to have meaningful conversation. Pt appears internally preoccupied/psychotic, not following instruction or answering questions. Pt appears to make random rapid movements.

## 2023-07-30 NOTE — CONSULT NOTE ADULT - ASSESSMENT
53y F with recent diagnosis of COVID (mild URI symptoms), who presents w/ CC of insomnia/AMS    Impression: fluctuating mental status i/s/o poor sleep and Benadryl use. No focal findings on exam. CTH neg. Would obtain EEG as priority to rule-out non-convulsive seizure. Low suspicion for encephalitis given <1 week of symptoms.    Recommendations:  [] video EEG for 24h  [] check vitamin B1, B6, B12, D, E, folate, TSH, free T4, ESR/CRP  [] delirium precautions and sleep hygiene  [] neuro checks per routine  [] if EEG unremarkable and patient continues to be altered, would obtain MR brain w/ w/o  [] psychiatry following, appreciate recs  [] will continue to follow with you  [] rest of care per primary team    Case discussed w/ attending Dr. Verduzco. Will be formally staffed in AM. Recommendations preliminary until attested.

## 2023-07-30 NOTE — CONSULT NOTE ADULT - SUBJECTIVE AND OBJECTIVE BOX
Neurology - Consult Note    -  Spectra: [phone] ([facility]), [phone] ([facility])  -    HPI: Patient GEOVANNI CALVO is a 53y (1970) woman with no past MHx, who is presenting w/ CC of AMS. Patient was recently traveling in [place] with family and diagnosed with COVID on 7/25. She was brought to ED today due to family concern for insomnia and patient acting bizarre. Patient slept poorly last night and family members gave several doses of Benadryl to treat the insomnia. In total, she received 150mg Benadryl. Afterwards, she was acting irritable, knocking things over, acting incoherent. ED work-up did not show any anemia/leukocytosis, significant renal/metabolic derangements. CTH was unremarkable and CXR showed clear lungs. Due to AMS, neurology consulted to ask if concern for seizure vs encephalitis. Went to bedside to assess patient. She is awake and alert. Appears distracted, making poor eye contact. Not answering any questions or cooperating with exam. No staring episodes or abnormal movements were appreciated. She was moving all extremities against gravity and was able to ambulate to bathroom unassisted.      Review of Systems:  unable to assess due to mental status    Allergies:  No Known Allergies      PMHx/PSHx/Family Hx: As above, otherwise see below   No pertinent past medical history    Vertigo        Social Hx:    works as  at [employer]    Medications:  MEDICATIONS  (STANDING):    MEDICATIONS  (PRN):      Vitals:  T(C): 36.6 (07-30-23 @ 16:06), Max: 37.1 (07-30-23 @ 12:52)  HR: 81 (07-30-23 @ 16:06) (81 - 92)  BP: 145/76 (07-30-23 @ 12:52) (145/76 - 145/76)  RR: 24 (07-30-23 @ 12:52) (24 - 24)  SpO2: 100% (07-30-23 @ 16:06) (98% - 100%)    Physical Examination:  General - NAD, well-developed female, appropriately dressed  Cardiovascular - Peripheral pulses palpable, no edema  Eyes - white sclera, no gaze deviation or aniscoria    Neurologic Exam:  Mental status - Awake, Alert. Speech fluent. Minimal verbal output. Calm. Not engaging in conversation or cooperating with exam. No staring episodes.    Cranial nerves - PERRLA, tracks examiner EOMI, facial strength intact without asymmetry b/l, hearing intact b/l    Motor - Normal bulk throughout. No pronator drift. Moving all extremities anti-gravity. Refused formal strength testing.    Sensation - Light touch intact throughout    DTR's - refused    Coordination -No tremors appreciated. No stiffening or myoclonic jerks.    Gait and station - Normal casual gait. Ambulating unassisted    Labs:                        12.7   6.77  )-----------( 244      ( 30 Jul 2023 14:20 )             36.8     07-30    142  |  103  |  13  ----------------------------<  254<H>  3.2<L>   |  18<L>  |  0.72    Ca    9.7      30 Jul 2023 14:40    TPro  8.4<H>  /  Alb  4.7  /  TBili  1.4<H>  /  DBili  x   /  AST  22  /  ALT  17  /  AlkPhos  76  07-30    CAPILLARY BLOOD GLUCOSE        LIVER FUNCTIONS - ( 30 Jul 2023 14:40 )  Alb: 4.7 g/dL / Pro: 8.4 g/dL / ALK PHOS: 76 U/L / ALT: 17 U/L / AST: 22 U/L / GGT: x                           Radiology:  CT Head No Cont:  (30 Jul 2023 15:51)  FINDINGS:    The ventricular and sulcal size and configuration is age appropriate.     There is no acute loss of gray-white differentiation.    There is no evidence of mass effect, midline shift, acute intracranial   hemorrhage, or extra-axial collections.     The calvarium is intact. Near complete opacification of the left   maxillary sinus.The mastoid air cells arepredominantly clear. The orbits   are unremarkable.      IMPRESSION:  No acute intracranial hemorrhage, mass effect, edema or midline shift.     Neurology - Consult Note    -  Spectra: [phone] ([facility]), [phone] ([facility])  -    HPI: Patient GEOVANNI CALVO is a 53y (1970) woman with no past MHx, who is presenting w/ CC of AMS. Patient was recently traveling in [place] with family and diagnosed with COVID on 7/25. She was brought to ED today due to family concern for insomnia and patient acting bizarre. Patient slept poorly last night and family members gave several doses of Benadryl to treat the insomnia. In total, she received 150mg Benadryl. Afterwards, she was acting irritable, knocking things over, acting incoherent. ED work-up did not show any anemia/leukocytosis, significant renal/metabolic derangements. CTH was unremarkable and CXR showed clear lungs. Due to AMS, neurology consulted to ask if concern for seizure vs encephalitis. Went to bedside to assess patient. She is awake and alert. Appears distracted, making poor eye contact. Not answering any questions or cooperating with exam. No staring episodes or abnormal movements were appreciated. She was moving all extremities against gravity and was able to ambulate to bathroom unassisted.      Review of Systems:  unable to assess due to mental status    Allergies:  No Known Allergies      PMHx/PSHx/Family Hx: As above, otherwise see below   No pertinent past medical history    Vertigo        Social Hx:    works as  at [employer]    Medications:  MEDICATIONS  (STANDING):    MEDICATIONS  (PRN):      Vitals:  T(C): 36.6 (07-30-23 @ 16:06), Max: 37.1 (07-30-23 @ 12:52)  HR: 81 (07-30-23 @ 16:06) (81 - 92)  BP: 145/76 (07-30-23 @ 12:52) (145/76 - 145/76)  RR: 24 (07-30-23 @ 12:52) (24 - 24)  SpO2: 100% (07-30-23 @ 16:06) (98% - 100%)    Physical Examination:  General - NAD, well-developed female, appropriately dressed  Cardiovascular - Peripheral pulses palpable, no edema  Eyes - white sclera, no gaze deviation or aniscoria    Neurologic Exam:  Mental status - Awake, Alert. Speech fluent. Minimal verbal output. Calm. Not engaging in conversation or cooperating with exam. No staring episodes.    Cranial nerves - PERRLA, tracks examiner EOMI, facial strength intact without asymmetry b/l, hearing intact b/l    Motor - Normal bulk throughout. Moving all extremities anti-gravity. Refused formal strength testing.    Sensation - refused formal testing. Grossly appears to have no difficulties with balance.    DTR's - refused    Coordination -No tremors appreciated. No stiffening or myoclonic jerks.    Gait and station - Normal casual gait. Ambulating unassisted    Labs:                        12.7   6.77  )-----------( 244      ( 30 Jul 2023 14:20 )             36.8     07-30    142  |  103  |  13  ----------------------------<  254<H>  3.2<L>   |  18<L>  |  0.72    Ca    9.7      30 Jul 2023 14:40    TPro  8.4<H>  /  Alb  4.7  /  TBili  1.4<H>  /  DBili  x   /  AST  22  /  ALT  17  /  AlkPhos  76  07-30    CAPILLARY BLOOD GLUCOSE        LIVER FUNCTIONS - ( 30 Jul 2023 14:40 )  Alb: 4.7 g/dL / Pro: 8.4 g/dL / ALK PHOS: 76 U/L / ALT: 17 U/L / AST: 22 U/L / GGT: x                           Radiology:  CT Head No Cont:  (30 Jul 2023 15:51)  FINDINGS:    The ventricular and sulcal size and configuration is age appropriate.     There is no acute loss of gray-white differentiation.    There is no evidence of mass effect, midline shift, acute intracranial   hemorrhage, or extra-axial collections.     The calvarium is intact. Near complete opacification of the left   maxillary sinus.The mastoid air cells arepredominantly clear. The orbits   are unremarkable.      IMPRESSION:  No acute intracranial hemorrhage, mass effect, edema or midline shift.

## 2023-07-30 NOTE — ED ADULT TRIAGE NOTE - CHIEF COMPLAINT QUOTE
Pt. brought from home for erratic behavior and not sleeping for 3 days. Diagnosed with Covid on 7/25, was complaining of runny nose and cough which resolved. Seen here yesterday for insomnia and told to take Benadryl. No psychiatric history.

## 2023-07-30 NOTE — H&P ADULT - NSHPLABSRESULTS_GEN_ALL_CORE
LABS:                          12.7   6.77  )-----------( 244      ( 30 Jul 2023 14:20 )             36.8     07-30    142  |  103  |  13  ----------------------------<  254<H>  3.2<L>   |  18<L>  |  0.72    Ca    9.7      30 Jul 2023 14:40    TPro  8.4<H>  /  Alb  4.7  /  TBili  1.4<H>  /  DBili  x   /  AST  22  /  ALT  17  /  AlkPhos  76  07-30    LIVER FUNCTIONS - ( 30 Jul 2023 14:40 )  Alb: 4.7 g/dL / Pro: 8.4 g/dL / ALK PHOS: 76 U/L / ALT: 17 U/L / AST: 22 U/L / GGT: x             Urinalysis Basic - ( 30 Jul 2023 14:40 )    Color: x / Appearance: x / SG: x / pH: x  Gluc: 254 mg/dL / Ketone: x  / Bili: x / Urobili: x   Blood: x / Protein: x / Nitrite: x   Leuk Esterase: x / RBC: x / WBC x   Sq Epi: x / Non Sq Epi: x / Bacteria: x Labs reviewed by me:                          12.7   6.77  )-----------( 244      ( 30 Jul 2023 14:20 )             36.8     07-30    142  |  103  |  13  ----------------------------<  254<H>  3.2<L>   |  18<L>  |  0.72    Ca    9.7      30 Jul 2023 14:40    TPro  8.4<H>  /  Alb  4.7  /  TBili  1.4<H>  /  DBili  x   /  AST  22  /  ALT  17  /  AlkPhos  76  07-30    LIVER FUNCTIONS - ( 30 Jul 2023 14:40 )  Alb: 4.7 g/dL / Pro: 8.4 g/dL / ALK PHOS: 76 U/L / ALT: 17 U/L / AST: 22 U/L / GGT: x             Urinalysis Basic - ( 30 Jul 2023 14:40 )    Color: x / Appearance: x / SG: x / pH: x  Gluc: 254 mg/dL / Ketone: x  / Bili: x / Urobili: x   Blood: x / Protein: x / Nitrite: x   Leuk Esterase: x / RBC: x / WBC x   Sq Epi: x / Non Sq Epi: x / Bacteria: x    CTH noncontrast:    IMPRESSION:  No acute intracranial hemorrhage, mass effect, edema or midline shift.    CXR my interpretation: clear lungs    EKG my interpretation: sinus bradycardia @ 56bpm, no STD or JACLYN

## 2023-07-30 NOTE — ED BEHAVIORAL HEALTH ASSESSMENT NOTE - SUMMARY
53 year-old with no psychiatric history, working as  at [employer], lives with  and adult children, presented yesterday for sleep-related complaints, presents today with rapid decline in mental status- now largely non-verbal, disorganized, appears to be hallucinations. She did take 150 mg of diphenhydramine including 100 mg this AM. While psychiatric illness may be underlying, rapid decline of cognition with neuropsych symptoms suggest possible delirium, perhaps anticholinergic  Plan is medical w/u and admission  CL psych to follow, as patient may have underlying mood d/o 53 year-old with no psychiatric history, working as  at [employer], lives with  and adult children, presented yesterday for sleep-related complaints, presents today with rapid decline in mental status- now largely non-verbal, disorganized, appears to be hallucinations. She did take 150 mg of diphenhydramine including 100 mg this AM. While psychiatric illness may be underlying, rapid decline of cognition with neuropsych symptoms suggest possible delirium, perhaps anticholinergic- labs with evidence of hyperglycemia and anion gap acidosis   COVID +   Plan is medical w/u and admission  CL psych to follow, as patient may have underlying mood d/o

## 2023-07-30 NOTE — H&P ADULT - TIME BILLING
I had a face to face encounter with this patient. I spent 90 total minutes on the bedside interview and examination, review of neurology and psychiatry consults, coordination of care, counseling, chart review, order placement and documentation for this patient.    This time spent by me is independent of time spent by resident managing patient

## 2023-07-30 NOTE — H&P ADULT - NSHPPHYSICALEXAM_GEN_ALL_CORE
Vital Signs Last 24 Hrs  T(C): 36.8 (30 Jul 2023 21:16), Max: 37.1 (30 Jul 2023 12:52)  T(F): 98.2 (30 Jul 2023 21:16), Max: 98.8 (30 Jul 2023 12:52)  HR: 82 (30 Jul 2023 21:16) (81 - 92)  BP: 123/91 (30 Jul 2023 21:16) (123/91 - 145/76)  BP(mean): --  RR: 18 (30 Jul 2023 21:16) (18 - 24)  SpO2: 100% (30 Jul 2023 21:16) (98% - 100%)    Parameters below as of 30 Jul 2023 16:06  Patient On (Oxygen Delivery Method): room air        CONSTITUTIONAL: Well-groomed, in no apparent distress  EYES: No conjunctival or scleral injection, non-icteric;   ENMT: No external nasal lesions; MMM  RESPIRATORY: Breathing comfortably; lungs CTA without wheeze/rhonchi/rales  CARDIOVASCULAR: +S1S2, RRR, no M/G/R; pedal pulses full and symmetric; no lower extremity edema  GASTROINTESTINAL: No palpable masses or tenderness, +BS throughout, no rebound/guarding  SKIN: No rashes or ulcers noted  NEUROLOGIC: CN II-XII intact; sensation intact in LEs b/l to light touch  PSYCHIATRIC: A+O x1, slow speech, difficulty finding words, unable to follow commands Vital Signs Last 24 Hrs  T(C): 36.8 (30 Jul 2023 21:16), Max: 37.1 (30 Jul 2023 12:52)  T(F): 98.2 (30 Jul 2023 21:16), Max: 98.8 (30 Jul 2023 12:52)  HR: 82 (30 Jul 2023 21:16) (81 - 92)  BP: 123/91 (30 Jul 2023 21:16) (123/91 - 145/76)  BP(mean): --  RR: 18 (30 Jul 2023 21:16) (18 - 24)  SpO2: 100% (30 Jul 2023 21:16) (98% - 100%)    Parameters below as of 30 Jul 2023 16:06  Patient On (Oxygen Delivery Method): room air        CONSTITUTIONAL: Well-groomed, in no apparent distress  EYES: No conjunctival or scleral injection, non-icteric;   ENMT: No external nasal lesions; MMM  RESPIRATORY: Breathing comfortably; lungs CTA without wheeze/rhonchi/rales  CARDIOVASCULAR: +S1S2, RRR, no M/G/R; pedal pulses full and symmetric; no lower extremity edema  GASTROINTESTINAL: No palpable masses or tenderness, +BS throughout, no rebound/guarding  SKIN: No rashes or ulcers noted  NEUROLOGIC: limited exam, moving all extremities, sensation intact to painful stimuli. Not cooperative with following commands to complete full neuro exam. No droop  PSYCHIATRIC: A+O x1, slow speech, difficulty finding words, unable to follow commands

## 2023-07-30 NOTE — ED BEHAVIORAL HEALTH ASSESSMENT NOTE - HPI (INCLUDE ILLNESS QUALITY, SEVERITY, DURATION, TIMING, CONTEXT, MODIFYING FACTORS, ASSOCIATED SIGNS AND SYMPTOMS)
53 year-old female, , three children, lives in [place] with , three adult children, works at [employer] full time, no past psychiatric history, no substance misuse, no significant past medical history, presented yesterday to ED for complaints of insomnia, returns today much worsened with confusion and behavioral disorganization. Patient was in her usual state of health until travel to [place] with family on vacation. She contracted COVID July 25th, returned with URI complaints. She began having trouble sleeping, seemed to be more talkative (?). Family decided to come to ED yesterday after several day of decreased sleep. She was seen by provider, benadryl recommended, f/u psych (she has not recently been sad or anhedonia, but "stressed" at work). Family reports that she was given benadryl 50 mg last night. Towards the end of the day yesterday she was speaking less, perhaps interacting less. She did not sleep last night.  reports that this AM he contacted a friend who told him that to help her sleep he should give her 100 mg more of benadryl, which they gave this AM. She became irritable, disorganized, began knocking things over, speaking more incoherently, family brought her back to ED. In ED, unable to give any information to this provider, seen motioning to the wall in her room, talking to herself. She does not respond to questions, seems suspicious of examiner.

## 2023-07-30 NOTE — H&P ADULT - PROBLEM SELECTOR PLAN 4
Diet: CC  DVT: SCDs  Dispo: pending clinical improvement Diet: dysphagia screen once MS improves, if not improving and not taking PO then would order maintenance fluids  DVT: Lovenox  Dispo: pending clinical improvement

## 2023-07-30 NOTE — H&P ADULT - HISTORY OF PRESENT ILLNESS
Patient is a 53-year-old female with a past medical history of vertigo who presents for altered behavior over the past 3 days. Patient was brought into the ED by  the day prior to admission for insomnia. Pt was given benadryl and d/c home. Pt unable to provide history at this time. Collateral obtained from , Rajan. Pt and family went on vacation from July 17-24 to [place]. Day prior to coming back, pt developed a running nose and cough. She tested positive for COVID on 7/25. She was also bit by a mosquito.   said pt was very stressed from her job since 7/26-28.  On Friday,  noted that she was talking a lot, overexplaining, apologizing to all children. He also observed that she would lose words, however, denied auditory or visual hallucinations. 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   On Sat, pt ran typical errands, and again in the evening, was speaking more than usual. At this point, pt had not slept for 48 hours and complained of being unable to sleep was brought to the ED where she was given benadryl. Her  reports that after she came back from the ED, she started speaking again in long run on sentences that did not make sense, would forget words and start another sentence. She was also very affectionate to her , hugging and kissing him. Pt was given haldol 5 and ativan 2 in the ED. Per psych note,   "unable to give any information to this provider, seen motioning to the wall in her room, talking to herself. She does not respond to questions, seems suspicious of examiner."

## 2023-07-30 NOTE — ED PROVIDER NOTE - NSICDXPASTSURGICALHX_GEN_ALL_CORE_FT
Acute pulmonary edema  Intubated. Wean ventilator when pulmonary edema improves.Appreciate Pulmonary assistance.    PAST SURGICAL HISTORY:  No significant past surgical history

## 2023-07-30 NOTE — ED PROVIDER NOTE - OBJECTIVE STATEMENT
54 y/o F , no known medical/ psychiatric illness BIB family secondary to insomnia, and bizarre behaviors. Patient was seen and discharged  in ER yesterday  and was  sent home on benadryl for Insomnia .  states that patient took 50 mg yesterday and again 100 mg this morning. Patient appears paranoid, expressing a flight of ideas.  states that patient was recently diagnosed with Covid 19 after returning from [place]  on 7/25/23.  Patient appears restless and disoriented , unable to participate in interview. Family denies any  history , while stating patient this high functional and was operating normal up to 3- 4 days ago.

## 2023-07-30 NOTE — H&P ADULT - PROBLEM SELECTOR PLAN 3
Pt found to have elevated blood sugar on CMP, possibly in the setting of steroid (in cough medicine)  -FS premeal and before bedtime  -ISS Pt found to have elevated blood sugar on CMP  -FS premeal and before bedtime  -ISS Pt found to have elevated blood sugar on CMP  -FS premeal and before bedtime  -ISS  -A1C

## 2023-07-31 DIAGNOSIS — G93.40 ENCEPHALOPATHY, UNSPECIFIED: ICD-10-CM

## 2023-07-31 LAB
A1C WITH ESTIMATED AVERAGE GLUCOSE RESULT: 5.8 % — HIGH (ref 4–5.6)
ALBUMIN SERPL ELPH-MCNC: 4.5 G/DL — SIGNIFICANT CHANGE UP (ref 3.3–5)
ALP SERPL-CCNC: 67 U/L — SIGNIFICANT CHANGE UP (ref 40–120)
ALT FLD-CCNC: 18 U/L — SIGNIFICANT CHANGE UP (ref 4–33)
AMMONIA BLD-MCNC: 40 UMOL/L — SIGNIFICANT CHANGE UP (ref 11–55)
AMPHET UR-MCNC: NEGATIVE — SIGNIFICANT CHANGE UP
ANION GAP SERPL CALC-SCNC: -1 MMOL/L — LOW (ref 7–14)
APPEARANCE UR: CLEAR — SIGNIFICANT CHANGE UP
APTT BLD: 31.1 SEC — SIGNIFICANT CHANGE UP (ref 24.5–35.6)
AST SERPL-CCNC: 25 U/L — SIGNIFICANT CHANGE UP (ref 4–32)
B BURGDOR C6 AB SER-ACNC: NEGATIVE — SIGNIFICANT CHANGE UP
B BURGDOR IGG+IGM SER-ACNC: 0.16 INDEX — SIGNIFICANT CHANGE UP (ref 0.01–0.89)
B-OH-BUTYR SERPL-SCNC: 1 MMOL/L — HIGH (ref 0–0.4)
BACTERIA # UR AUTO: ABNORMAL /HPF
BARBITURATES UR SCN-MCNC: NEGATIVE — SIGNIFICANT CHANGE UP
BASE EXCESS BLDV CALC-SCNC: 0.5 MMOL/L — SIGNIFICANT CHANGE UP (ref -2–3)
BASOPHILS # BLD AUTO: 0.02 K/UL — SIGNIFICANT CHANGE UP (ref 0–0.2)
BASOPHILS NFR BLD AUTO: 0.3 % — SIGNIFICANT CHANGE UP (ref 0–2)
BENZODIAZ UR-MCNC: NEGATIVE — SIGNIFICANT CHANGE UP
BILIRUB SERPL-MCNC: 1.6 MG/DL — HIGH (ref 0.2–1.2)
BILIRUB UR-MCNC: NEGATIVE — SIGNIFICANT CHANGE UP
BLOOD GAS VENOUS COMPREHENSIVE RESULT: SIGNIFICANT CHANGE UP
BUN SERPL-MCNC: 14 MG/DL — SIGNIFICANT CHANGE UP (ref 7–23)
CALCIUM SERPL-MCNC: 9.2 MG/DL — SIGNIFICANT CHANGE UP (ref 8.4–10.5)
CAST: 1 /LPF — SIGNIFICANT CHANGE UP (ref 0–4)
CHLORIDE BLDV-SCNC: 105 MMOL/L — SIGNIFICANT CHANGE UP (ref 96–108)
CHLORIDE SERPL-SCNC: 116 MMOL/L — HIGH (ref 98–107)
CO2 BLDV-SCNC: 26.7 MMOL/L — HIGH (ref 22–26)
CO2 SERPL-SCNC: 20 MMOL/L — LOW (ref 22–31)
COCAINE METAB.OTHER UR-MCNC: NEGATIVE — SIGNIFICANT CHANGE UP
COLOR SPEC: YELLOW — SIGNIFICANT CHANGE UP
CREAT SERPL-MCNC: 0.6 MG/DL — SIGNIFICANT CHANGE UP (ref 0.5–1.3)
CREATININE URINE RESULT, DAU: 149 MG/DL — SIGNIFICANT CHANGE UP
CRP SERPL-MCNC: <3 MG/L — SIGNIFICANT CHANGE UP
D DIMER BLD IA.RAPID-MCNC: 689 NG/ML DDU — HIGH
DIFF PNL FLD: NEGATIVE — SIGNIFICANT CHANGE UP
EBV EA AB SER IA-ACNC: <5 U/ML — SIGNIFICANT CHANGE UP
EBV EA AB TITR SER IF: POSITIVE
EBV EA IGG SER-ACNC: NEGATIVE — SIGNIFICANT CHANGE UP
EBV NA IGG SER IA-ACNC: >600 U/ML — HIGH
EBV PATRN SPEC IB-IMP: SIGNIFICANT CHANGE UP
EBV VCA IGG AVIDITY SER QL IA: POSITIVE
EBV VCA IGM SER IA-ACNC: <10 U/ML — SIGNIFICANT CHANGE UP
EBV VCA IGM SER IA-ACNC: >750 U/ML — HIGH
EBV VCA IGM TITR FLD: NEGATIVE — SIGNIFICANT CHANGE UP
EGFR: 108 ML/MIN/1.73M2 — SIGNIFICANT CHANGE UP
EOSINOPHIL # BLD AUTO: 0.01 K/UL — SIGNIFICANT CHANGE UP (ref 0–0.5)
EOSINOPHIL NFR BLD AUTO: 0.1 % — SIGNIFICANT CHANGE UP (ref 0–6)
ERYTHROCYTE [SEDIMENTATION RATE] IN BLOOD: 17 MM/HR — SIGNIFICANT CHANGE UP (ref 4–25)
ESTIMATED AVERAGE GLUCOSE: 120 — SIGNIFICANT CHANGE UP
FERRITIN SERPL-MCNC: 169 NG/ML — SIGNIFICANT CHANGE UP (ref 13–330)
FOLATE SERPL-MCNC: 15 NG/ML — SIGNIFICANT CHANGE UP (ref 3.1–17.5)
GAS PNL BLDV: 143 MMOL/L — SIGNIFICANT CHANGE UP (ref 136–145)
GAS PNL BLDV: SIGNIFICANT CHANGE UP
GLUCOSE BLDC GLUCOMTR-MCNC: 100 MG/DL — HIGH (ref 70–99)
GLUCOSE BLDC GLUCOMTR-MCNC: 110 MG/DL — HIGH (ref 70–99)
GLUCOSE BLDC GLUCOMTR-MCNC: 156 MG/DL — HIGH (ref 70–99)
GLUCOSE BLDC GLUCOMTR-MCNC: 157 MG/DL — HIGH (ref 70–99)
GLUCOSE BLDC GLUCOMTR-MCNC: 166 MG/DL — HIGH (ref 70–99)
GLUCOSE BLDV-MCNC: 160 MG/DL — HIGH (ref 70–99)
GLUCOSE SERPL-MCNC: 161 MG/DL — HIGH (ref 70–99)
GLUCOSE UR QL: 250 MG/DL
HAV IGM SER-ACNC: SIGNIFICANT CHANGE UP
HBV CORE IGM SER-ACNC: SIGNIFICANT CHANGE UP
HBV SURFACE AG SER-ACNC: SIGNIFICANT CHANGE UP
HCO3 BLDV-SCNC: 25 MMOL/L — SIGNIFICANT CHANGE UP (ref 22–29)
HCT VFR BLD CALC: 34.8 % — SIGNIFICANT CHANGE UP (ref 34.5–45)
HCT VFR BLDA CALC: 39 % — SIGNIFICANT CHANGE UP (ref 34.5–46.5)
HCV AB S/CO SERPL IA: 0.08 S/CO — SIGNIFICANT CHANGE UP (ref 0–0.99)
HCV AB SERPL-IMP: SIGNIFICANT CHANGE UP
HGB BLD CALC-MCNC: 13 G/DL — SIGNIFICANT CHANGE UP (ref 11.7–16.1)
HGB BLD-MCNC: 12.2 G/DL — SIGNIFICANT CHANGE UP (ref 11.5–15.5)
HIV 1+2 AB+HIV1 P24 AG SERPL QL IA: SIGNIFICANT CHANGE UP
IANC: 5.19 K/UL — SIGNIFICANT CHANGE UP (ref 1.8–7.4)
IMM GRANULOCYTES NFR BLD AUTO: 0.6 % — SIGNIFICANT CHANGE UP (ref 0–0.9)
INR BLD: 1.18 RATIO — SIGNIFICANT CHANGE UP (ref 0.85–1.18)
KETONES UR-MCNC: 40 MG/DL
LACTATE BLDV-MCNC: 1.1 MMOL/L — SIGNIFICANT CHANGE UP (ref 0.5–2)
LEUKOCYTE ESTERASE UR-ACNC: NEGATIVE — SIGNIFICANT CHANGE UP
LYMPHOCYTES # BLD AUTO: 1.36 K/UL — SIGNIFICANT CHANGE UP (ref 1–3.3)
LYMPHOCYTES # BLD AUTO: 19 % — SIGNIFICANT CHANGE UP (ref 13–44)
MAGNESIUM SERPL-MCNC: 2.1 MG/DL — SIGNIFICANT CHANGE UP (ref 1.6–2.6)
MCHC RBC-ENTMCNC: 30.6 PG — SIGNIFICANT CHANGE UP (ref 27–34)
MCHC RBC-ENTMCNC: 35.1 GM/DL — SIGNIFICANT CHANGE UP (ref 32–36)
MCV RBC AUTO: 87.2 FL — SIGNIFICANT CHANGE UP (ref 80–100)
METHADONE UR-MCNC: NEGATIVE — SIGNIFICANT CHANGE UP
MONOCYTES # BLD AUTO: 0.52 K/UL — SIGNIFICANT CHANGE UP (ref 0–0.9)
MONOCYTES NFR BLD AUTO: 7.3 % — SIGNIFICANT CHANGE UP (ref 2–14)
NEUTROPHILS # BLD AUTO: 5.19 K/UL — SIGNIFICANT CHANGE UP (ref 1.8–7.4)
NEUTROPHILS NFR BLD AUTO: 72.7 % — SIGNIFICANT CHANGE UP (ref 43–77)
NITRITE UR-MCNC: NEGATIVE — SIGNIFICANT CHANGE UP
NRBC # BLD: 0 /100 WBCS — SIGNIFICANT CHANGE UP (ref 0–0)
NRBC # FLD: 0 K/UL — SIGNIFICANT CHANGE UP (ref 0–0)
OPIATES UR-MCNC: NEGATIVE — SIGNIFICANT CHANGE UP
OXYCODONE UR-MCNC: NEGATIVE — SIGNIFICANT CHANGE UP
PCO2 BLDV: 41 MMHG — SIGNIFICANT CHANGE UP (ref 39–52)
PCP SPEC-MCNC: SIGNIFICANT CHANGE UP
PCP UR-MCNC: NEGATIVE — SIGNIFICANT CHANGE UP
PH BLDV: 7.4 — SIGNIFICANT CHANGE UP (ref 7.32–7.43)
PH UR: 6.5 — SIGNIFICANT CHANGE UP (ref 5–8)
PHOSPHATE SERPL-MCNC: 3 MG/DL — SIGNIFICANT CHANGE UP (ref 2.5–4.5)
PLATELET # BLD AUTO: 229 K/UL — SIGNIFICANT CHANGE UP (ref 150–400)
PO2 BLDV: 46 MMHG — HIGH (ref 25–45)
POTASSIUM BLDV-SCNC: 3.1 MMOL/L — LOW (ref 3.5–5.1)
POTASSIUM SERPL-MCNC: 3 MMOL/L — LOW (ref 3.5–5.3)
POTASSIUM SERPL-SCNC: 3 MMOL/L — LOW (ref 3.5–5.3)
PROCALCITONIN SERPL-MCNC: 0.02 NG/ML — SIGNIFICANT CHANGE UP (ref 0.02–0.1)
PROT SERPL-MCNC: 7.6 G/DL — SIGNIFICANT CHANGE UP (ref 6–8.3)
PROT UR-MCNC: 30 MG/DL
PROTHROM AB SERPL-ACNC: 13.2 SEC — HIGH (ref 9.5–13)
RBC # BLD: 3.99 M/UL — SIGNIFICANT CHANGE UP (ref 3.8–5.2)
RBC # FLD: 12.8 % — SIGNIFICANT CHANGE UP (ref 10.3–14.5)
RBC CASTS # UR COMP ASSIST: 2 /HPF — SIGNIFICANT CHANGE UP (ref 0–4)
SAO2 % BLDV: 77.3 % — SIGNIFICANT CHANGE UP (ref 67–88)
SODIUM SERPL-SCNC: 135 MMOL/L — SIGNIFICANT CHANGE UP (ref 135–145)
SP GR SPEC: 1.02 — SIGNIFICANT CHANGE UP (ref 1–1.03)
SQUAMOUS # UR AUTO: 2 /HPF — SIGNIFICANT CHANGE UP (ref 0–5)
T PALLIDUM AB TITR SER: NEGATIVE — SIGNIFICANT CHANGE UP
THC UR QL: NEGATIVE — SIGNIFICANT CHANGE UP
UROBILINOGEN FLD QL: 1 MG/DL — SIGNIFICANT CHANGE UP (ref 0.2–1)
VIT B12 SERPL-MCNC: 915 PG/ML — HIGH (ref 200–900)
WBC # BLD: 7.14 K/UL — SIGNIFICANT CHANGE UP (ref 3.8–10.5)
WBC # FLD AUTO: 7.14 K/UL — SIGNIFICANT CHANGE UP (ref 3.8–10.5)
WBC UR QL: 1 /HPF — SIGNIFICANT CHANGE UP (ref 0–5)

## 2023-07-31 PROCEDURE — 99233 SBSQ HOSP IP/OBS HIGH 50: CPT

## 2023-07-31 PROCEDURE — 99223 1ST HOSP IP/OBS HIGH 75: CPT | Mod: GC

## 2023-07-31 PROCEDURE — 99232 SBSQ HOSP IP/OBS MODERATE 35: CPT | Mod: GC

## 2023-07-31 RX ORDER — DEXTROSE 50 % IN WATER 50 %
25 SYRINGE (ML) INTRAVENOUS ONCE
Refills: 0 | Status: DISCONTINUED | OUTPATIENT
Start: 2023-07-31 | End: 2023-08-02

## 2023-07-31 RX ORDER — DEXTROSE 50 % IN WATER 50 %
15 SYRINGE (ML) INTRAVENOUS ONCE
Refills: 0 | Status: DISCONTINUED | OUTPATIENT
Start: 2023-07-31 | End: 2023-08-02

## 2023-07-31 RX ORDER — SODIUM CHLORIDE 9 MG/ML
1000 INJECTION, SOLUTION INTRAVENOUS
Refills: 0 | Status: DISCONTINUED | OUTPATIENT
Start: 2023-07-31 | End: 2023-08-02

## 2023-07-31 RX ORDER — POTASSIUM CHLORIDE 20 MEQ
10 PACKET (EA) ORAL
Refills: 0 | Status: DISCONTINUED | OUTPATIENT
Start: 2023-07-31 | End: 2023-07-31

## 2023-07-31 RX ORDER — HALOPERIDOL DECANOATE 100 MG/ML
2.5 INJECTION INTRAMUSCULAR EVERY 6 HOURS
Refills: 0 | Status: DISCONTINUED | OUTPATIENT
Start: 2023-07-31 | End: 2023-07-31

## 2023-07-31 RX ORDER — OLANZAPINE 15 MG/1
2.5 TABLET, FILM COATED ORAL EVERY 6 HOURS
Refills: 0 | Status: DISCONTINUED | OUTPATIENT
Start: 2023-07-31 | End: 2023-07-31

## 2023-07-31 RX ORDER — HALOPERIDOL DECANOATE 100 MG/ML
5 INJECTION INTRAMUSCULAR ONCE
Refills: 0 | Status: DISCONTINUED | OUTPATIENT
Start: 2023-07-31 | End: 2023-07-31

## 2023-07-31 RX ORDER — POTASSIUM CHLORIDE 20 MEQ
40 PACKET (EA) ORAL EVERY 4 HOURS
Refills: 0 | Status: COMPLETED | OUTPATIENT
Start: 2023-07-31 | End: 2023-07-31

## 2023-07-31 RX ORDER — ENOXAPARIN SODIUM 100 MG/ML
40 INJECTION SUBCUTANEOUS EVERY 24 HOURS
Refills: 0 | Status: DISCONTINUED | OUTPATIENT
Start: 2023-07-31 | End: 2023-08-04

## 2023-07-31 RX ORDER — INSULIN LISPRO 100/ML
VIAL (ML) SUBCUTANEOUS
Refills: 0 | Status: DISCONTINUED | OUTPATIENT
Start: 2023-07-31 | End: 2023-08-02

## 2023-07-31 RX ORDER — DEXTROSE 50 % IN WATER 50 %
12.5 SYRINGE (ML) INTRAVENOUS ONCE
Refills: 0 | Status: DISCONTINUED | OUTPATIENT
Start: 2023-07-31 | End: 2023-08-02

## 2023-07-31 RX ORDER — HALOPERIDOL DECANOATE 100 MG/ML
5 INJECTION INTRAMUSCULAR ONCE
Refills: 0 | Status: COMPLETED | OUTPATIENT
Start: 2023-07-31 | End: 2023-07-31

## 2023-07-31 RX ORDER — GLUCAGON INJECTION, SOLUTION 0.5 MG/.1ML
1 INJECTION, SOLUTION SUBCUTANEOUS ONCE
Refills: 0 | Status: DISCONTINUED | OUTPATIENT
Start: 2023-07-31 | End: 2023-08-02

## 2023-07-31 RX ADMIN — Medication 40 MILLIEQUIVALENT(S): at 18:27

## 2023-07-31 RX ADMIN — Medication 2 MILLIGRAM(S): at 01:46

## 2023-07-31 RX ADMIN — ENOXAPARIN SODIUM 40 MILLIGRAM(S): 100 INJECTION SUBCUTANEOUS at 04:50

## 2023-07-31 RX ADMIN — HALOPERIDOL DECANOATE 5 MILLIGRAM(S): 100 INJECTION INTRAMUSCULAR at 01:07

## 2023-07-31 RX ADMIN — Medication 1: at 09:25

## 2023-07-31 RX ADMIN — Medication 40 MILLIEQUIVALENT(S): at 13:32

## 2023-07-31 RX ADMIN — Medication 1: at 21:14

## 2023-07-31 NOTE — PATIENT PROFILE ADULT - FALL HARM RISK - RISK INTERVENTIONS
Assistance OOB with selected safe patient handling equipment/Assistance with ambulation/Communicate Fall Risk and Risk Factors to all staff, patient, and family/Monitor for mental status changes/Monitor gait and stability/Reinforce activity limits and safety measures with patient and family/Reorient to person, place and time as needed/Review medications for side effects contributing to fall risk/Sit up slowly, dangle for a short time, stand at bedside before walking/Toileting schedule using arm’s reach rule for commode and bathroom/Use of alarms - bed, chair and/or voice tab/Visual Cue: Yellow wristband/Bed in lowest position, wheels locked, appropriate side rails in place/Call bell, personal items and telephone in reach/Instruct patient to call for assistance before getting out of bed or chair/Non-slip footwear when patient is out of bed/[place] to call system/Physically safe environment - no spills, clutter or unnecessary equipment/Purposeful Proactive Rounding/Room/bathroom lighting operational, light cord in reach

## 2023-07-31 NOTE — PROGRESS NOTE ADULT - PROBLEM SELECTOR PLAN 3
Pt found to have elevated blood sugar on CMP  -FS premeal and before bedtime  -ISS  -A1C Pt found to have elevated blood sugar on CMP  -FS premeal and before bedtime  -ISS  -A1C 5.8  - ctm glucose

## 2023-07-31 NOTE — PROGRESS NOTE ADULT - SUBJECTIVE AND OBJECTIVE BOX
Patient is a 53y old  Female who presents with a chief complaint of altered mental status (30 Jul 2023 21:18)      SUBJECTIVE / OVERNIGHT EVENTS:    MEDICATIONS  (STANDING):  dextrose 5%. 1000 milliLiter(s) (100 mL/Hr) IV Continuous <Continuous>  dextrose 5%. 1000 milliLiter(s) (50 mL/Hr) IV Continuous <Continuous>  dextrose 50% Injectable 25 Gram(s) IV Push once  dextrose 50% Injectable 25 Gram(s) IV Push once  dextrose 50% Injectable 12.5 Gram(s) IV Push once  enoxaparin Injectable 40 milliGRAM(s) SubCutaneous every 24 hours  glucagon  Injectable 1 milliGRAM(s) IntraMuscular once  insulin lispro (ADMELOG) corrective regimen sliding scale   SubCutaneous Before meals and at bedtime    MEDICATIONS  (PRN):  dextrose Oral Gel 15 Gram(s) Oral once PRN Blood Glucose LESS THAN 70 milliGRAM(s)/deciliter  haloperidol    Injectable 2.5 milliGRAM(s) IntraMuscular every 6 hours PRN agitation  LORazepam   Injectable 2 milliGRAM(s) IV Push every 6 hours PRN Agitation      CAPILLARY BLOOD GLUCOSE      POCT Blood Glucose.: 156 mg/dL (31 Jul 2023 02:22)    I&O's Summary      PHYSICAL EXAM:  Vital Signs Last 24 Hrs  T(C): 36.8 (30 Jul 2023 21:16), Max: 37.1 (30 Jul 2023 12:52)  T(F): 98.2 (30 Jul 2023 21:16), Max: 98.8 (30 Jul 2023 12:52)  HR: 82 (30 Jul 2023 21:16) (81 - 92)  BP: 123/91 (30 Jul 2023 21:16) (123/91 - 145/76)  BP(mean): --  RR: 18 (30 Jul 2023 21:16) (18 - 24)  SpO2: 100% (30 Jul 2023 21:16) (98% - 100%)    Parameters below as of 30 Jul 2023 16:06  Patient On (Oxygen Delivery Method): room air        GENERAL: No acute distress, well-developed  HEAD:  Atraumatic, Normocephalic  EYES: EOMI, PERRLA, conjunctiva and sclera clear  NECK: Supple, no lymphadenopathy, no JVD  CHEST/LUNG: CTAB; No wheezes, rales, or rhonchi  HEART: Regular rate and rhythm; No murmurs, rubs, or gallops  ABDOMEN: Soft, non-tender, non-distended; normal bowel sounds, no organomegaly  EXTREMITIES:  2+ peripheral pulses b/l, No clubbing, cyanosis, or edema  NEUROLOGY: A&O x 3, no focal deficits  SKIN: No rashes or lesions    LABS:                        12.7   6.77  )-----------( 244      ( 30 Jul 2023 14:20 )             36.8     07-30    142  |  103  |  13  ----------------------------<  254<H>  3.2<L>   |  18<L>  |  0.72    Ca    9.7      30 Jul 2023 14:40    TPro  8.4<H>  /  Alb  4.7  /  TBili  1.4<H>  /  DBili  x   /  AST  22  /  ALT  17  /  AlkPhos  76  07-30          Urinalysis Basic - ( 30 Jul 2023 14:40 )    Color: x / Appearance: x / SG: x / pH: x  Gluc: 254 mg/dL / Ketone: x  / Bili: x / Urobili: x   Blood: x / Protein: x / Nitrite: x   Leuk Esterase: x / RBC: x / WBC x   Sq Epi: x / Non Sq Epi: x / Bacteria: x          RADIOLOGY & ADDITIONAL TESTS:  Results Reviewed:   Imaging Personally Reviewed:  Electrocardiogram Personally Reviewed:    COORDINATION OF CARE:  Care Discussed with Consultants/Other Providers [Y/N]:  Prior or Outpatient Records Reviewed [Y/N]:   Patient is a 53y old  Female who presents with a chief complaint of altered mental status (30 Jul 2023 21:18)      SUBJECTIVE / OVERNIGHT EVENTS:    Patient sleeping comfortably at time of exam. No reported events overnight. Patient seen and examined at bedside    MEDICATIONS  (STANDING):  dextrose 5%. 1000 milliLiter(s) (100 mL/Hr) IV Continuous <Continuous>  dextrose 5%. 1000 milliLiter(s) (50 mL/Hr) IV Continuous <Continuous>  dextrose 50% Injectable 25 Gram(s) IV Push once  dextrose 50% Injectable 25 Gram(s) IV Push once  dextrose 50% Injectable 12.5 Gram(s) IV Push once  enoxaparin Injectable 40 milliGRAM(s) SubCutaneous every 24 hours  glucagon  Injectable 1 milliGRAM(s) IntraMuscular once  insulin lispro (ADMELOG) corrective regimen sliding scale   SubCutaneous Before meals and at bedtime    MEDICATIONS  (PRN):  dextrose Oral Gel 15 Gram(s) Oral once PRN Blood Glucose LESS THAN 70 milliGRAM(s)/deciliter  haloperidol    Injectable 2.5 milliGRAM(s) IntraMuscular every 6 hours PRN agitation  LORazepam   Injectable 2 milliGRAM(s) IV Push every 6 hours PRN Agitation      CAPILLARY BLOOD GLUCOSE      POCT Blood Glucose.: 156 mg/dL (31 Jul 2023 02:22)    I&O's Summary      PHYSICAL EXAM:  Vital Signs Last 24 Hrs  T(C): 36.8 (30 Jul 2023 21:16), Max: 37.1 (30 Jul 2023 12:52)  T(F): 98.2 (30 Jul 2023 21:16), Max: 98.8 (30 Jul 2023 12:52)  HR: 82 (30 Jul 2023 21:16) (81 - 92)  BP: 123/91 (30 Jul 2023 21:16) (123/91 - 145/76)  BP(mean): --  RR: 18 (30 Jul 2023 21:16) (18 - 24)  SpO2: 100% (30 Jul 2023 21:16) (98% - 100%)    Parameters below as of 30 Jul 2023 16:06  Patient On (Oxygen Delivery Method): room air        GENERAL: No acute distress, well-developed  HEAD:  Atraumatic, Normocephalic  EYES: EOMI, PERRLA, conjunctiva and sclera clear  NECK: Supple, no lymphadenopathy, no JVD  CHEST/LUNG: CTAB; No wheezes, rales, or rhonchi  HEART: Regular rate and rhythm; No murmurs, rubs, or gallops  ABDOMEN: Soft, non-tender, non-distended; normal bowel sounds, no organomegaly  EXTREMITIES:  2+ peripheral pulses b/l, No clubbing, cyanosis, or edema  NEUROLOGIC: limited exam. Spontaneously moving extremities. Not cooperative with following commands. No focal deficits.  PSYCHIATRIC: A+O x1, speech is slow with word-finding difficulty    LABS:                        12.7   6.77  )-----------( 244      ( 30 Jul 2023 14:20 )             36.8     07-30    142  |  103  |  13  ----------------------------<  254<H>  3.2<L>   |  18<L>  |  0.72    Ca    9.7      30 Jul 2023 14:40    TPro  8.4<H>  /  Alb  4.7  /  TBili  1.4<H>  /  DBili  x   /  AST  22  /  ALT  17  /  AlkPhos  76  07-30          Urinalysis Basic - ( 30 Jul 2023 14:40 )    Color: x / Appearance: x / SG: x / pH: x  Gluc: 254 mg/dL / Ketone: x  / Bili: x / Urobili: x   Blood: x / Protein: x / Nitrite: x   Leuk Esterase: x / RBC: x / WBC x   Sq Epi: x / Non Sq Epi: x / Bacteria: x          RADIOLOGY & ADDITIONAL TESTS:  Results Reviewed:   Imaging Personally Reviewed:  Electrocardiogram Personally Reviewed:    COORDINATION OF CARE:  Care Discussed with Consultants/Other Providers [Y/N]:  Prior or Outpatient Records Reviewed [Y/N]:

## 2023-07-31 NOTE — BH CONSULTATION LIAISON PROGRESS NOTE - NSBHFUPINTERVALHXFT_PSY_A_CORE
53 year-old female, , three children, lives in [place] with , three adult children, works at [employer] full time, no past psychiatric history, no substance misuse, no significant past medical history, presented yesterday to ED for complaints of insomnia, returns again much worsened with confusion and behavioral disorganization. Patient was in her usual state of health until travel to [place] with family on vacation. She contracted COVID July 25th, returned with URI complaints. She began having trouble sleeping, seemed to be more talkative (?). Family decided to come to ED  after several day of decreased sleep. She was seen by provider, benadryl recommended, f/u psych (she has not recently been sad or anhedonia, but "stressed" at work). Family reports that she was given benadryl 50 mg last night. Towards the end of the day yesterday she was speaking less, perhaps interacting less. She did not sleep last night.  reports that this AM he contacted a friend who told him that to help her sleep he should give her 100 mg more of benadryl, which they gave this AM. She became irritable, disorganized, began knocking things over, speaking more incoherently, family brought her back to ED. In ED, unable to give any information to this provider, seen motioning to the wall in her room, talking to herself. She does not respond to questions, seems suspicious of examiner.    Patient seen and evaluated at the bedside. She was laying in bed, calm, controlled in NAD. Patient presented with a flat affect, psychomotor retardation and spoke in a soft tone of voice. When asked what brought her to the hospital, she pointed towards her abdomen and said "vesicles", later she replied, "I don't know". She was A & O X2. She stated that she is not sure what brought her to the hospital, admitted to feeling tired and wanting to sleep right now. Reported that she has been sleeping 6 hours at night, however doesn't feel fatigued during the day. Denied any changes in her appetite. She also denied any depressive symptoms, denied any recent irritability or euphoric episodes. Patient did mention about worrying about her children and her mother, but denied that the worries are excessive or out of the norm saying "just regular stuff" She also denied AH, VH, paranoia, SI, HI. She denied any recent use of illicit drugs. She denied any past psychiatric h/o or h/o psychotropic medication trials.

## 2023-07-31 NOTE — PROGRESS NOTE ADULT - ASSESSMENT
Patient is a 53-year-old female with a past medical history of vertigo who presents for altered behavior over the past 3 days. Patient is a 53-year-old female with a past medical history of vertigo who presents for altered behavior over the past 3 days found to be altered, unable to follow commands, COVID+. Neuro and psych following. Patient admitted for further workup of acute encephalopathy.

## 2023-07-31 NOTE — PROGRESS NOTE ADULT - PROBLEM SELECTOR PLAN 2
Pt tested positive for COVID on 7/25. Unable to determine if symptomatic. Not hypoxic, CXR clear  -no indication for remdesivir, dex given no hypoxia  -lovenox  -D-dimer, procalc in AM  -CXR negative  -without respiratory complaints  -ID consult in the AM Pt tested positive for COVID on 7/25. Unable to determine if symptomatic. Not hypoxic, CXR clear  -no indication for remdesivir, dex given no hypoxia  -lovenox  - procal wnl  - D-dimer elevated   -CXR negative  -without respiratory complaints

## 2023-07-31 NOTE — PROGRESS NOTE ADULT - ATTENDING COMMENTS
Patient seen and examined by me. Case discussed with resident and agree with the resident's findings and plan as documented in the resident's note. 53F with a past medical history of vertigo who presents for altered behavior over the past 3 days found to be altered, unable to follow commands found to be COVID+. Neuro and psych following. Patient admitted for further workup of acute encephalopathy.    1. Acute encephalopathy  -unclear etiology. Acute decline over the last several days in setting of antihistamine use (50mg benadryl night prior and 100mg in AM and brompheniramine) though unclear if reached an excess amount of this. Possible mood disorder per psych and also with COVID  -constant observation, haldol/ativan PRN  -EEG per neuro; MRI if EEG unremarkable  -f/u neuro recs and need for LP to r/o encephalitis; however, per neuro, encephalitis less likely due to sx <1 week  -f/u lab work-up:     --vitamin B1, B6, D, E, syphilis, free T4    --[place], EBV, CMV  -f/u ID c/s  -f/u psych recs    2. COVID+:  -no indication for remdesivir or dex  -c/w lovenox daily    3. DVT ppx:  -c/w Lovenox daily

## 2023-07-31 NOTE — BH CONSULTATION LIAISON PROGRESS NOTE - NSBHASSESSMENTFT_PSY_ALL_CORE
53 year-old with no psychiatric history, presented yesterday for sleep-related complaints, presents today with rapid decline in mental status. As per collateral, she did take 150 mg of diphenhydramine including 100 mg this AM. Rapid decline of cognition with neuro- psychiatric symptoms suggest possible delirium. Differential is wide, COVID related vs Metabolic vs underlying psychiatric disturbance.  (although unlikely). Labs reviewed in EMR, CT Head neg, UA/UTox neg    On eval, she appeared slightly illogical, with flat affect/ psychomotor retardation, confused.      Plan:  CW 1:1 observation due to delirium   For agitation, can use Zyprexa 2.5 mg IM Q6 PRN, monitor QTc  No standing psychotropics at this time  Neuro recs appreciated  Rest of the management per primary team   CL will follow     53 year-old with no psychiatric history, presented yesterday for sleep-related complaints, presents today with rapid decline in mental status. As per collateral, she did take 150 mg of diphenhydramine including 100 mg this AM. Rapid decline of cognition with neuro- psychiatric symptoms suggest possible delirium. Differential is wide, COVID related vs Metabolic vs underlying psychiatric disturbance.  (although unlikely). Labs reviewed in EMR, CT Head neg, UA/UTox neg    On eval, she appeared slightly illogical, with flat affect/ psychomotor retardation, confused.      Plan:  CW 1:1 observation due to delirium   For agitation, for now would recommend ativan 1mg q6h prn, with an additional 1mg 20 min later   	(trying to avoid antipsychotics- NO ZYPREXA within 1 hour of IM/IV ativan)  No standing psychotropics at this time  Neuro recs appreciated  Rest of the management per primary team   CL will follow     53 year-old with no psychiatric history, presented yesterday for sleep-related complaints, presents today with rapid decline in mental status. As per collateral, she did take 150 mg of diphenhydramine including 100 mg this AM. Rapid decline of cognition with neuro- psychiatric symptoms suggest possible delirium. Differential is wide, COVID related vs Metabolic vs underlying psychiatric disturbance.  (although unlikely). Labs reviewed in EMR, CT Head neg, UA/UTox neg    On eval, she appeared slightly illogical, with flat affect/ psychomotor retardation, confused.      Plan:  CW 1:1 observation due to delirium   For agitation, for now would recommend ativan 1mg q6h prn, with an additional 1mg 20 min later   	[] (trying to avoid antipsychotics- NO ZYPREXA within 1 hour of IM/IV ativan)  	[] be mindful patient has covid- monitor and hold for hypoxia, bradycarida, hypotension  No standing psychotropics at this time  Neuro recs appreciated  Rest of the management per primary team   CL will follow

## 2023-07-31 NOTE — PROGRESS NOTE ADULT - PROBLEM SELECTOR PLAN 1
Presenting with insomnia, bizarre behavior, intermittent agitation, disorientation, incoherent/nonsensical speech  Unclear etiology at this time. The patient does not have prior psych/neuro history. She was recently on benadryl and brompheniramine which are both antihistamines. Anticholinergic/medication induced delirium is a possibility as well as an unspecified mood disorder. She also is COVID positive which could potentially contribute.  Per neuro encephalitis less likely due to sx <1 week  - consult appreciated. Possible mood disorder but with rapid decline at this time  -hold all antihistamines  -bladder scan, monitor for urinary retention. Patient however has been voiding in ED  -CTH neg  -ativan/haldol PRNs  -neuro consult, EEG ordered, vitamin B1, B6, B12, D, E, folate, syphilis, ammonia, TSH, (nl) free T4, ESR/CRP  -lyme Ab, [place], hep panel, HIV, EBV, CMV  -discuss with neuro if LP recommended or MRI  -ID c/s in AM to r/o infectious etiologies  -if EEG unremarkable with continued altered behavior, consider MRI   -f/u UA, UCx  -on 1:1 given intermittent agitation Presenting with insomnia, bizarre behavior, intermittent agitation, disorientation, incoherent/nonsensical speech  Unclear etiology at this time.   - No prior psych/neuro history but underlying psychiatric etiology possible  - Recently discharged on benadryl and brompheniramine - possibly anticholinergic medication induced delirium  - Infectious workup neg at this time; COVID+  - Neuro following - encephalitis less likely due to sx <1 week  -  consult appreciated. Possible mood disorder but with rapid decline at this time  - hold all antihistamines  - bladder scan, monitor for urinary retention. Patient however has been voiding in ED  - CTH neg  - ativan/haldol PRNs  - neuro consult, EEG ordered  - vitamin B1, B6, B12, D, E, folate, syphilis, ammonia, TSH, (nl) free T4, ESR/CRP  - lyme Ab, [place], hep panel, HIV, EBV, CMV  - discuss with neuro if LP recommended or MRI  - if EEG unremarkable with continued altered behavior, consider MRI   - f/u UA, UCx  - on 1:1 given intermittent agitation

## 2023-07-31 NOTE — PROGRESS NOTE ADULT - PROBLEM SELECTOR PLAN 4
Diet: dysphagia screen once MS improves, if not improving and not taking PO then would order maintenance fluids  DVT: Lovenox  Dispo: pending clinical improvement Diet: passed dysphagia screen, regular diet  DVT: Lovenox  Dispo: pending clinical improvement

## 2023-08-01 LAB
ALBUMIN SERPL ELPH-MCNC: 4.3 G/DL — SIGNIFICANT CHANGE UP (ref 3.3–5)
ALP SERPL-CCNC: 71 U/L — SIGNIFICANT CHANGE UP (ref 40–120)
ALT FLD-CCNC: 16 U/L — SIGNIFICANT CHANGE UP (ref 4–33)
ANION GAP SERPL CALC-SCNC: 12 MMOL/L — SIGNIFICANT CHANGE UP (ref 7–14)
AST SERPL-CCNC: 23 U/L — SIGNIFICANT CHANGE UP (ref 4–32)
BILIRUB SERPL-MCNC: 1.4 MG/DL — HIGH (ref 0.2–1.2)
BUN SERPL-MCNC: 15 MG/DL — SIGNIFICANT CHANGE UP (ref 7–23)
CALCIUM SERPL-MCNC: 9.5 MG/DL — SIGNIFICANT CHANGE UP (ref 8.4–10.5)
CHLORIDE SERPL-SCNC: 104 MMOL/L — SIGNIFICANT CHANGE UP (ref 98–107)
CMV DNA CSF QL NAA+PROBE: SIGNIFICANT CHANGE UP IU/ML
CMV DNA SPEC NAA+PROBE-LOG#: SIGNIFICANT CHANGE UP LOG10IU/ML
CO2 SERPL-SCNC: 26 MMOL/L — SIGNIFICANT CHANGE UP (ref 22–31)
CREAT SERPL-MCNC: 0.67 MG/DL — SIGNIFICANT CHANGE UP (ref 0.5–1.3)
EGFR: 105 ML/MIN/1.73M2 — SIGNIFICANT CHANGE UP
GLUCOSE BLDC GLUCOMTR-MCNC: 114 MG/DL — HIGH (ref 70–99)
GLUCOSE BLDC GLUCOMTR-MCNC: 125 MG/DL — HIGH (ref 70–99)
GLUCOSE BLDC GLUCOMTR-MCNC: 126 MG/DL — HIGH (ref 70–99)
GLUCOSE SERPL-MCNC: 120 MG/DL — HIGH (ref 70–99)
HCT VFR BLD CALC: 37.9 % — SIGNIFICANT CHANGE UP (ref 34.5–45)
HGB BLD-MCNC: 12.8 G/DL — SIGNIFICANT CHANGE UP (ref 11.5–15.5)
MAGNESIUM SERPL-MCNC: 2.1 MG/DL — SIGNIFICANT CHANGE UP (ref 1.6–2.6)
MCHC RBC-ENTMCNC: 30 PG — SIGNIFICANT CHANGE UP (ref 27–34)
MCHC RBC-ENTMCNC: 33.8 GM/DL — SIGNIFICANT CHANGE UP (ref 32–36)
MCV RBC AUTO: 89 FL — SIGNIFICANT CHANGE UP (ref 80–100)
NRBC # BLD: 0 /100 WBCS — SIGNIFICANT CHANGE UP (ref 0–0)
NRBC # FLD: 0 K/UL — SIGNIFICANT CHANGE UP (ref 0–0)
PHOSPHATE SERPL-MCNC: 3.3 MG/DL — SIGNIFICANT CHANGE UP (ref 2.5–4.5)
PLATELET # BLD AUTO: 234 K/UL — SIGNIFICANT CHANGE UP (ref 150–400)
POTASSIUM SERPL-MCNC: 3.8 MMOL/L — SIGNIFICANT CHANGE UP (ref 3.5–5.3)
POTASSIUM SERPL-SCNC: 3.8 MMOL/L — SIGNIFICANT CHANGE UP (ref 3.5–5.3)
PROT SERPL-MCNC: 7.7 G/DL — SIGNIFICANT CHANGE UP (ref 6–8.3)
RBC # BLD: 4.26 M/UL — SIGNIFICANT CHANGE UP (ref 3.8–5.2)
RBC # FLD: 13.2 % — SIGNIFICANT CHANGE UP (ref 10.3–14.5)
SODIUM SERPL-SCNC: 142 MMOL/L — SIGNIFICANT CHANGE UP (ref 135–145)
WBC # BLD: 5.52 K/UL — SIGNIFICANT CHANGE UP (ref 3.8–10.5)
WBC # FLD AUTO: 5.52 K/UL — SIGNIFICANT CHANGE UP (ref 3.8–10.5)

## 2023-08-01 PROCEDURE — 99232 SBSQ HOSP IP/OBS MODERATE 35: CPT

## 2023-08-01 PROCEDURE — 99232 SBSQ HOSP IP/OBS MODERATE 35: CPT | Mod: GC

## 2023-08-01 PROCEDURE — 95720 EEG PHY/QHP EA INCR W/VEEG: CPT

## 2023-08-01 RX ADMIN — Medication 3: at 09:18

## 2023-08-01 RX ADMIN — ENOXAPARIN SODIUM 40 MILLIGRAM(S): 100 INJECTION SUBCUTANEOUS at 05:21

## 2023-08-01 NOTE — BH CONSULTATION LIAISON PROGRESS NOTE - NSBHASSESSMENTFT_PSY_ALL_CORE
53 year-old with no psychiatric history, presented for sleep-related complaints, disorganized behavior, rapid decline in mental status along with recent COVID infection. As per collateral, she took 150 mg of diphenhydramine. Rapid decline of cognition with neuro- psychiatric symptoms suggest possible delirium which seems to be improving. Differential is wide, COVID related vs Metabolic vs Iatrogenic. As per  present at the bedside, patient is back to her baseline. She is no longer confused and is now able to recall all the events over the last few days.  Labs reviewed in EMR: COVID +, CT Head neg, UA/UTox neg. Vitamin B1, B6, B12, D, E, folate syphilis, ammonia, TSH, (nl) free T4, ESR/CRP, Lyme Ab,  Hep panel, HIV, CMV- Neg.   EBV serologies suggesting past infection    Overall, patient has shown significant improvement in her mental status as compared to yesterday, she is more alert and logical in her conversation    Plan:  Can discontinue 1:1 observation as patient has been calm, controlled, denying SI/HI/AVH  For agitation, for now would recommend ativan 1mg q6h prn, with an additional 1mg 20 min later.   NO ZYPREXA within 1 hour of IM/IV ativan. Monitor for hypoxia, respiratory depression, excessive sedation  No standing psychotropics at this time  Neuro recs appreciated  Rest of the management per primary team  53 year-old with no psychiatric history, presented for sleep-related complaints, disorganized behavior, rapid decline in mental status along with recent COVID infection. As per collateral, she took 150 mg of diphenhydramine. Rapid decline of cognition with neuro- psychiatric symptoms suggest possible delirium which seems to be improving. Differential is wide, COVID related vs Metabolic vs Iatrogenic. As per  present at the bedside, patient is back to her baseline. She is no longer confused and is now able to recall all the events over the last few days.  Labs reviewed in EMR: COVID +, CT Head neg, UA/UTox neg. Vitamin B1, B6, B12, D, E, folate syphilis, ammonia, TSH, (nl) free T4, ESR/CRP, Lyme Ab,  Hep panel, HIV, CMV- Neg.   EBV serologies suggesting past infection    Overall, patient has shown significant improvement in her mental status as compared to yesterday, she is more alert and logical in her conversation    Plan:  Can discontinue 1:1 observation as patient has been calm, controlled, denying SI/HI/AVH  For agitation, for now would recommend ativan 1mg q6h prn, with an additional 1mg 20 min later.   NO ZYPREXA within 1 hour of IM/IV ativan. Monitor for hypoxia, respiratory depression, excessive sedation  No standing psychotropics at this time  Neuro recs appreciated, f/u EEG  Rest of the management per primary team  53 year-old with no psychiatric history, presented for sleep-related complaints, disorganized behavior, rapid decline in mental status along with recent COVID infection. As per collateral, she took 150 mg of diphenhydramine. Rapid decline of cognition with neuro- psychiatric symptoms suggest possible delirium which seems to be improving. Differential is wide, COVID related vs Metabolic vs Iatrogenic. As per  present at the bedside, patient is back to her baseline. She is no longer confused and is now able to recall all the events over the last few days.  Labs reviewed in EMR: COVID +, CT Head neg, UA/UTox neg. Vitamin B1, B6, B12, D, E, folate syphilis, ammonia, TSH, (nl) free T4, ESR/CRP, Lyme Ab,  Hep panel, HIV, CMV- Neg.   EBV serologies suggesting past infection    Overall, patient has shown significant improvement in her mental status as compared to yesterday, she is more alert and logical in her conversation    Plan:  Can discontinue 1:1 observation as patient has been calm, controlled, denying SI/HI/AVH  For agitation, for now would recommend ativan 1mg q6h prn, with an additional 1mg 20 min later.   NO ZYPREXA within 1 hour of IM/IV ativan. Monitor for hypoxia, respiratory depression, excessive sedation  No standing psychotropics at this time  Neuro recs appreciated, f/u EEG  Rest of the management per primary team   - Dispo: no role for inpatient psych care. Likely outpatient care when medically cleared.

## 2023-08-01 NOTE — PROGRESS NOTE ADULT - PROBLEM SELECTOR PLAN 2
Pt tested positive for COVID on 7/25. Unable to determine if symptomatic. Not hypoxic, CXR clear  -no indication for remdesivir, dex given no hypoxia  -lovenox  - procal wnl  - D-dimer elevated   -CXR negative  -without respiratory complaints

## 2023-08-01 NOTE — BH CONSULTATION LIAISON PROGRESS NOTE - NSBHFUPINTERVALHXFT_PSY_A_CORE
8/1/23  Chart reviewed. No acute events overnight. Patient didn't receive any PRN's overnight.  Patient seen and evaluated at the bedside. She was laying in bed, calm, controlled in NAD, getting EEG. She was A & O X 3. Patient reported that she is feeling much better than yesterday, is now able to comprehend the circumstances which lead to her admission. Reported that she had only been sleeping for 5 hours for the past few days, was "throwing things around" and speaking a lot. She denied experiencing any such behaviors in the past. She denied manic symptoms including euphoria or irritability, grandiosity, flight of ideas etc. She also denied any depressive symptoms. She denied any symptoms of WHITLEY, panic d/o. She also denied AH, VH, paranoia, SI, HI. She denied any recent use of illicit drugs. She denied any past psychiatric h/o or h/o psychotropic medication trials.

## 2023-08-01 NOTE — CHART NOTE - NSCHARTNOTEFT_GEN_A_CORE
EEG preliminary read (not final) on the initial recording hour(s) = Approximately 1.5 hr  Reviewed from: 11:29-12:57    Normal awake, drowsy, and asleep EEG.  No epileptiform abnormalities.    Final report to follow tomorrow morning after completion of study.    [facility] EEG Reading Room Ph#: [phone]  Epilepsy Answering Service after 5PM and before 8:30AM: Ph#: [phone]

## 2023-08-01 NOTE — PROGRESS NOTE ADULT - PROBLEM SELECTOR PLAN 3
Pt found to have elevated blood sugar on CMP  -FS premeal and before bedtime  -ISS  -A1C 5.8  - ctm glucose

## 2023-08-01 NOTE — PROGRESS NOTE ADULT - SUBJECTIVE AND OBJECTIVE BOX
Patient is a 53y old  Female who presents with a chief complaint of altered mental status (31 Jul 2023 07:04)      SUBJECTIVE / OVERNIGHT EVENTS:    Patient resting comfortably with eyes closed, stating that she feels fine. No acute events overnight.    MEDICATIONS  (STANDING):  dextrose 5%. 1000 milliLiter(s) (100 mL/Hr) IV Continuous <Continuous>  dextrose 5%. 1000 milliLiter(s) (50 mL/Hr) IV Continuous <Continuous>  dextrose 50% Injectable 25 Gram(s) IV Push once  dextrose 50% Injectable 25 Gram(s) IV Push once  dextrose 50% Injectable 12.5 Gram(s) IV Push once  enoxaparin Injectable 40 milliGRAM(s) SubCutaneous every 24 hours  glucagon  Injectable 1 milliGRAM(s) IntraMuscular once  insulin lispro (ADMELOG) corrective regimen sliding scale   SubCutaneous Before meals and at bedtime    MEDICATIONS  (PRN):  dextrose Oral Gel 15 Gram(s) Oral once PRN Blood Glucose LESS THAN 70 milliGRAM(s)/deciliter  LORazepam   Injectable 1 milliGRAM(s) IV Push every 6 hours PRN Agitation      CAPILLARY BLOOD GLUCOSE      POCT Blood Glucose.: 157 mg/dL (31 Jul 2023 21:06)  POCT Blood Glucose.: 100 mg/dL (31 Jul 2023 17:58)  POCT Blood Glucose.: 110 mg/dL (31 Jul 2023 13:12)  POCT Blood Glucose.: 166 mg/dL (31 Jul 2023 08:43)    I&O's Summary      PHYSICAL EXAM:  Vital Signs Last 24 Hrs  T(C): 36.7 (01 Aug 2023 05:00), Max: 37 (31 Jul 2023 11:10)  T(F): 98.1 (01 Aug 2023 05:00), Max: 98.6 (31 Jul 2023 11:10)  HR: 75 (01 Aug 2023 05:00) (64 - 96)  BP: 100/72 (01 Aug 2023 05:00) (100/72 - 124/73)  BP(mean): --  RR: 18 (01 Aug 2023 05:00) (15 - 18)  SpO2: 99% (01 Aug 2023 05:00) (99% - 100%)    Parameters below as of 01 Aug 2023 05:00  Patient On (Oxygen Delivery Method): room air        GENERAL: No acute distress, well-developed  HEAD:  Atraumatic, Normocephalic  EYES: EOMI, PERRLA, conjunctiva and sclera clear  NECK: Supple, no lymphadenopathy, no JVD  CHEST/LUNG: CTAB; No wheezes, rales, or rhonchi  HEART: Regular rate and rhythm; No murmurs, rubs, or gallops  ABDOMEN: Soft, non-tender, non-distended; normal bowel sounds, no organomegaly  EXTREMITIES:  2+ peripheral pulses b/l, No clubbing, cyanosis, or edema  NEUROLOGIC: limited exam. Spontaneously moving extremities. Not cooperative with following commands. No focal deficits.  PSYCHIATRIC: A+O x1, speech is slow with word-finding difficulty    LABS:                        12.8   5.52  )-----------( 234      ( 01 Aug 2023 05:13 )             37.9     08-01    142  |  104  |  15  ----------------------------<  120<H>  3.8   |  26  |  0.67    Ca    9.5      01 Aug 2023 05:13  Phos  3.3     08-01  Mg     2.10     08-01    TPro  7.7  /  Alb  4.3  /  TBili  1.4<H>  /  DBili  x   /  AST  23  /  ALT  16  /  AlkPhos  71  08-01    PT/INR - ( 31 Jul 2023 07:11 )   PT: 13.2 sec;   INR: 1.18 ratio         PTT - ( 31 Jul 2023 07:11 )  PTT:31.1 sec      Urinalysis Basic - ( 01 Aug 2023 05:13 )    Color: x / Appearance: x / SG: x / pH: x  Gluc: 120 mg/dL / Ketone: x  / Bili: x / Urobili: x   Blood: x / Protein: x / Nitrite: x   Leuk Esterase: x / RBC: x / WBC x   Sq Epi: x / Non Sq Epi: x / Bacteria: x          RADIOLOGY & ADDITIONAL TESTS:  Results Reviewed:   Imaging Personally Reviewed:  Electrocardiogram Personally Reviewed:    COORDINATION OF CARE:  Care Discussed with Consultants/Other Providers [Y/N]:  Prior or Outpatient Records Reviewed [Y/N]:   Patient is a 53y old  Female who presents with a chief complaint of altered mental status (31 Jul 2023 07:04)      SUBJECTIVE / OVERNIGHT EVENTS:    Patient resting comfortably, stating that she feels fine. No acute events overnight. Patient more alert and focused during our conversation today. States she doesn't fully know what happened or remember everything she said to her  or family. Does recall the conversations she had while in the hospital. She says she had insomnia the past few days with high amounts of energy. Night prior to admission, patient felt nausea and unusual feeling in her abdomen, but no dizziness. She has had vertigo just a couple times in the past, for which she was given meclizine in the ED.     MEDICATIONS  (STANDING):  dextrose 5%. 1000 milliLiter(s) (100 mL/Hr) IV Continuous <Continuous>  dextrose 5%. 1000 milliLiter(s) (50 mL/Hr) IV Continuous <Continuous>  dextrose 50% Injectable 25 Gram(s) IV Push once  dextrose 50% Injectable 25 Gram(s) IV Push once  dextrose 50% Injectable 12.5 Gram(s) IV Push once  enoxaparin Injectable 40 milliGRAM(s) SubCutaneous every 24 hours  glucagon  Injectable 1 milliGRAM(s) IntraMuscular once  insulin lispro (ADMELOG) corrective regimen sliding scale   SubCutaneous Before meals and at bedtime    MEDICATIONS  (PRN):  dextrose Oral Gel 15 Gram(s) Oral once PRN Blood Glucose LESS THAN 70 milliGRAM(s)/deciliter  LORazepam   Injectable 1 milliGRAM(s) IV Push every 6 hours PRN Agitation      CAPILLARY BLOOD GLUCOSE      POCT Blood Glucose.: 157 mg/dL (31 Jul 2023 21:06)  POCT Blood Glucose.: 100 mg/dL (31 Jul 2023 17:58)  POCT Blood Glucose.: 110 mg/dL (31 Jul 2023 13:12)  POCT Blood Glucose.: 166 mg/dL (31 Jul 2023 08:43)    I&O's Summary      PHYSICAL EXAM:  Vital Signs Last 24 Hrs  T(C): 36.7 (01 Aug 2023 05:00), Max: 37 (31 Jul 2023 11:10)  T(F): 98.1 (01 Aug 2023 05:00), Max: 98.6 (31 Jul 2023 11:10)  HR: 75 (01 Aug 2023 05:00) (64 - 96)  BP: 100/72 (01 Aug 2023 05:00) (100/72 - 124/73)  BP(mean): --  RR: 18 (01 Aug 2023 05:00) (15 - 18)  SpO2: 99% (01 Aug 2023 05:00) (99% - 100%)    Parameters below as of 01 Aug 2023 05:00  Patient On (Oxygen Delivery Method): room air        GENERAL: No acute distress, well-developed  HEAD:  Atraumatic, Normocephalic  EYES: EOMI, PERRLA, conjunctiva and sclera clear  NECK: Supple, no lymphadenopathy, no JVD  CHEST/LUNG: CTAB; No wheezes, rales, or rhonchi  HEART: Regular rate and rhythm; No murmurs, rubs, or gallops  ABDOMEN: Soft, non-tender, non-distended; normal bowel sounds, no organomegaly  EXTREMITIES:  2+ peripheral pulses b/l, No clubbing, cyanosis, or edema  NEUROLOGIC: Following commands. No focal deficits.  PSYCHIATRIC: A+O x3, normal speech, appropriate affect.    LABS:                        12.8   5.52  )-----------( 234      ( 01 Aug 2023 05:13 )             37.9     08-01    142  |  104  |  15  ----------------------------<  120<H>  3.8   |  26  |  0.67    Ca    9.5      01 Aug 2023 05:13  Phos  3.3     08-01  Mg     2.10     08-01    TPro  7.7  /  Alb  4.3  /  TBili  1.4<H>  /  DBili  x   /  AST  23  /  ALT  16  /  AlkPhos  71  08-01    PT/INR - ( 31 Jul 2023 07:11 )   PT: 13.2 sec;   INR: 1.18 ratio         PTT - ( 31 Jul 2023 07:11 )  PTT:31.1 sec      Urinalysis Basic - ( 01 Aug 2023 05:13 )    Color: x / Appearance: x / SG: x / pH: x  Gluc: 120 mg/dL / Ketone: x  / Bili: x / Urobili: x   Blood: x / Protein: x / Nitrite: x   Leuk Esterase: x / RBC: x / WBC x   Sq Epi: x / Non Sq Epi: x / Bacteria: x          RADIOLOGY & ADDITIONAL TESTS:  Results Reviewed:   Imaging Personally Reviewed:  Electrocardiogram Personally Reviewed:    COORDINATION OF CARE:  Care Discussed with Consultants/Other Providers [Y/N]:  Prior or Outpatient Records Reviewed [Y/N]:

## 2023-08-01 NOTE — PROGRESS NOTE ADULT - ASSESSMENT
Patient is a 53-year-old female with a past medical history of vertigo who presents for altered behavior over the past 3 days found to be altered, unable to follow commands, COVID+. Neuro and psych following. Patient admitted for further workup of acute encephalopathy.

## 2023-08-01 NOTE — PROGRESS NOTE ADULT - ATTENDING COMMENTS
Patient seen and examined by me. Case discussed with resident and agree with the resident's findings and plan as documented in the resident's note. 53F with a past medical history of vertigo who presents for altered behavior over the past 3 days found to be altered, unable to follow commands found to be COVID+. Neuro and psych following. Patient admitted for further workup of acute encephalopathy.    1. Acute encephalopathy - resolving.  -unclear etiology. Acute decline over the last several days in setting of antihistamine use (50mg benadryl night prior and 100mg in AM and brompheniramine) though unclear if reached an excess amount of this.   -constant observation, haldol/ativan PRN  -d/w psych dc'ing 1:1  -EEG per neuro; MRI if EEG unremarkable and still indicated - plan for EEG today  -f/u neuro recs and need for LP to r/o encephalitis; however, per neuro, encephalitis less likely due to sx <1 week  -f/u lab work-up:     --vitamin B1, B6, D, E, free T4    --[place], CMV  -f/u psych recs    2. COVID+:  -no indication for remdesivir or dex  -c/w lovenox daily    3. DVT ppx:  -c/w Lovenox daily

## 2023-08-01 NOTE — BH CONSULTATION LIAISON PROGRESS NOTE - NSBHATTESTCOMMENTATTENDFT_PSY_A_CORE
Chart reviewed. Pt seen with  present. Calm, cooperative, getting EEG placed. Denies psychiatric disturbances and was fully oriented. Much improved exam today. Agree with above assessment/recs. 
Chart reviewed. I agree with Dr. Moreau's history, MSE , A/P with below.  Patient states that she feels ok, however her affect is blunted. Her eyes remain closed, but when asked if she is tired, she denies this. She denies feeling depressed, denies SI/HI. Denies AH/VH. Thought process is goal directed vs at times impoverished. Attention seems impaired.    For now, will recommend continuing neuro/organic workup.  Appreciate concern for delirium- however for now, will recommend PRN ativan in case there is concern for possible catatonic symptoms.

## 2023-08-01 NOTE — PROGRESS NOTE ADULT - PROBLEM SELECTOR PLAN 1
Presenting with insomnia, bizarre behavior, intermittent agitation, disorientation, incoherent/nonsensical speech  Unclear etiology at this time.   - No prior psych/neuro history but underlying psychiatric etiology possible  - Recently discharged on benadryl and brompheniramine - possibly anticholinergic medication induced delirium  - Infectious workup neg at this time; COVID+  - WBC wnl  - Neuro following - encephalitis less likely due to sx <1 week  -  consult appreciated. Possible mood disorder but with rapid decline at this time  - hold all antihistamines  - bladder scan, monitor for urinary retention. Patient however has been voiding in ED  - CTH neg  - ativan/haldol PRNs  - neuro consult, EEG ordered  - vitamin B1, B6, B12, D, E, folate syphilis, ammonia, TSH, (nl) free T4, ESR/CRP - negative thusfar  - lyme Ab, [place], hep panel, HIV, CMV  - EBV serologies suggesting past infection  - discuss with neuro if LP recommended or MRI  - if EEG unremarkable with continued altered behavior, consider MRI   - f/u UA, UCx  - on 1:1 given intermittent agitation Presenting with insomnia, bizarre behavior, intermittent agitation, disorientation, incoherent/nonsensical speech  Unclear etiology at this time.   - No prior psych/neuro history but underlying psychiatric etiology possible  - Recently discharged on benadryl and brompheniramine - possibly anticholinergic medication induced delirium  - Infectious workup neg at this time; COVID+  - WBC wnl  - Neuro following - encephalitis less likely due to sx <1 week  -  consult appreciated. Possible mood disorder but with rapid decline at this time; holding off on seroquel for agitation due to possible catatonia  - hold all antihistamines  - bladder scan, monitor for urinary retention. Patient however has been voiding in ED  - CTH neg  - Ativan PRNs  - neuro consult, EEG ordered  - vitamin B1, B6, B12, D, E, folate syphilis, ammonia, TSH, (nl) free T4, ESR/CRP - negative thusfar  - lyme Ab, [place], hep panel, HIV, CMV  - EBV serologies suggesting past infection  - discuss with neuro if LP recommended or MRI  - if EEG unremarkable with continued altered behavior, consider MRI   - f/u UA, UCx  - on 1:1 given intermittent agitation

## 2023-08-02 LAB
24R-OH-CALCIDIOL SERPL-MCNC: 32.4 NG/ML — SIGNIFICANT CHANGE UP (ref 30–80)
ALBUMIN SERPL ELPH-MCNC: 4 G/DL — SIGNIFICANT CHANGE UP (ref 3.3–5)
ALP SERPL-CCNC: 76 U/L — SIGNIFICANT CHANGE UP (ref 40–120)
ALT FLD-CCNC: 14 U/L — SIGNIFICANT CHANGE UP (ref 4–33)
ANION GAP SERPL CALC-SCNC: 12 MMOL/L — SIGNIFICANT CHANGE UP (ref 7–14)
AST SERPL-CCNC: 17 U/L — SIGNIFICANT CHANGE UP (ref 4–32)
BILIRUB SERPL-MCNC: 1.1 MG/DL — SIGNIFICANT CHANGE UP (ref 0.2–1.2)
BUN SERPL-MCNC: 16 MG/DL — SIGNIFICANT CHANGE UP (ref 7–23)
CALCIUM SERPL-MCNC: 9.1 MG/DL — SIGNIFICANT CHANGE UP (ref 8.4–10.5)
CHLORIDE SERPL-SCNC: 101 MMOL/L — SIGNIFICANT CHANGE UP (ref 98–107)
CO2 SERPL-SCNC: 24 MMOL/L — SIGNIFICANT CHANGE UP (ref 22–31)
CREAT SERPL-MCNC: 0.72 MG/DL — SIGNIFICANT CHANGE UP (ref 0.5–1.3)
EGFR: 101 ML/MIN/1.73M2 — SIGNIFICANT CHANGE UP
GLUCOSE SERPL-MCNC: 144 MG/DL — HIGH (ref 70–99)
HCT VFR BLD CALC: 36.7 % — SIGNIFICANT CHANGE UP (ref 34.5–45)
HGB BLD-MCNC: 13 G/DL — SIGNIFICANT CHANGE UP (ref 11.5–15.5)
MAGNESIUM SERPL-MCNC: 2 MG/DL — SIGNIFICANT CHANGE UP (ref 1.6–2.6)
MCHC RBC-ENTMCNC: 30.6 PG — SIGNIFICANT CHANGE UP (ref 27–34)
MCHC RBC-ENTMCNC: 35.4 GM/DL — SIGNIFICANT CHANGE UP (ref 32–36)
MCV RBC AUTO: 86.4 FL — SIGNIFICANT CHANGE UP (ref 80–100)
NRBC # BLD: 0 /100 WBCS — SIGNIFICANT CHANGE UP (ref 0–0)
NRBC # FLD: 0 K/UL — SIGNIFICANT CHANGE UP (ref 0–0)
PHOSPHATE SERPL-MCNC: 4 MG/DL — SIGNIFICANT CHANGE UP (ref 2.5–4.5)
PLATELET # BLD AUTO: 235 K/UL — SIGNIFICANT CHANGE UP (ref 150–400)
POTASSIUM SERPL-MCNC: 3.6 MMOL/L — SIGNIFICANT CHANGE UP (ref 3.5–5.3)
POTASSIUM SERPL-SCNC: 3.6 MMOL/L — SIGNIFICANT CHANGE UP (ref 3.5–5.3)
PROT SERPL-MCNC: 7.4 G/DL — SIGNIFICANT CHANGE UP (ref 6–8.3)
RBC # BLD: 4.25 M/UL — SIGNIFICANT CHANGE UP (ref 3.8–5.2)
RBC # FLD: 12.9 % — SIGNIFICANT CHANGE UP (ref 10.3–14.5)
SODIUM SERPL-SCNC: 137 MMOL/L — SIGNIFICANT CHANGE UP (ref 135–145)
VIT D25+D1,25 OH+D1,25 PNL SERPL-MCNC: 107 PG/ML — HIGH (ref 19.9–79.3)
WBC # BLD: 5.34 K/UL — SIGNIFICANT CHANGE UP (ref 3.8–10.5)
WBC # FLD AUTO: 5.34 K/UL — SIGNIFICANT CHANGE UP (ref 3.8–10.5)
WNV IGG TITR FLD: NEGATIVE — SIGNIFICANT CHANGE UP
WNV IGM SPEC QL: NEGATIVE — SIGNIFICANT CHANGE UP

## 2023-08-02 PROCEDURE — 99232 SBSQ HOSP IP/OBS MODERATE 35: CPT | Mod: GC

## 2023-08-02 RX ORDER — LANOLIN ALCOHOL/MO/W.PET/CERES
3 CREAM (GRAM) TOPICAL AT BEDTIME
Refills: 0 | Status: DISCONTINUED | OUTPATIENT
Start: 2023-08-02 | End: 2023-08-04

## 2023-08-02 RX ADMIN — ENOXAPARIN SODIUM 40 MILLIGRAM(S): 100 INJECTION SUBCUTANEOUS at 05:18

## 2023-08-02 RX ADMIN — Medication 3 MILLIGRAM(S): at 22:17

## 2023-08-02 NOTE — BH CONSULTATION LIAISON PROGRESS NOTE - ATTENDING COMMENTS
Chart reviewed. While I did not examine this patient in person, I spoke with Dr. Moreau and agree with her history, MSE, A/P.

## 2023-08-02 NOTE — BH CONSULTATION LIAISON PROGRESS NOTE - CURRENT MEDICATION
MEDICATIONS  (STANDING):  dextrose 5%. 1000 milliLiter(s) (100 mL/Hr) IV Continuous <Continuous>  dextrose 5%. 1000 milliLiter(s) (50 mL/Hr) IV Continuous <Continuous>  dextrose 50% Injectable 25 Gram(s) IV Push once  dextrose 50% Injectable 25 Gram(s) IV Push once  dextrose 50% Injectable 12.5 Gram(s) IV Push once  enoxaparin Injectable 40 milliGRAM(s) SubCutaneous every 24 hours  glucagon  Injectable 1 milliGRAM(s) IntraMuscular once  insulin lispro (ADMELOG) corrective regimen sliding scale   SubCutaneous Before meals and at bedtime    MEDICATIONS  (PRN):  dextrose Oral Gel 15 Gram(s) Oral once PRN Blood Glucose LESS THAN 70 milliGRAM(s)/deciliter  LORazepam   Injectable 1 milliGRAM(s) IV Push every 6 hours PRN Agitation  
MEDICATIONS  (STANDING):  dextrose 5%. 1000 milliLiter(s) (100 mL/Hr) IV Continuous <Continuous>  dextrose 5%. 1000 milliLiter(s) (50 mL/Hr) IV Continuous <Continuous>  dextrose 50% Injectable 25 Gram(s) IV Push once  dextrose 50% Injectable 25 Gram(s) IV Push once  dextrose 50% Injectable 12.5 Gram(s) IV Push once  enoxaparin Injectable 40 milliGRAM(s) SubCutaneous every 24 hours  glucagon  Injectable 1 milliGRAM(s) IntraMuscular once  insulin lispro (ADMELOG) corrective regimen sliding scale   SubCutaneous Before meals and at bedtime  potassium chloride    Tablet ER 40 milliEquivalent(s) Oral every 4 hours    MEDICATIONS  (PRN):  dextrose Oral Gel 15 Gram(s) Oral once PRN Blood Glucose LESS THAN 70 milliGRAM(s)/deciliter  haloperidol    Injectable 2.5 milliGRAM(s) IntraMuscular every 6 hours PRN agitation  LORazepam   Injectable 2 milliGRAM(s) IV Push every 6 hours PRN Agitation  
MEDICATIONS  (STANDING):  enoxaparin Injectable 40 milliGRAM(s) SubCutaneous every 24 hours    MEDICATIONS  (PRN):  LORazepam   Injectable 1 milliGRAM(s) IV Push every 6 hours PRN Agitation

## 2023-08-02 NOTE — PROGRESS NOTE ADULT - PROBLEM SELECTOR PLAN 3
Pt found to have elevated blood sugar on CMP  -FS premeal and before bedtime  -ISS  -A1C 5.8  - ctm glucose Pt found to have elevated blood sugar on CMP  -FS premeal and before bedtime  -ISS  -A1C 5.8  - ctm glucose, adequately controlled

## 2023-08-02 NOTE — PROGRESS NOTE ADULT - ATTENDING COMMENTS
Patient seen and examined by me. Case discussed with resident and agree with the resident's findings and plan as documented in the resident's note. 53F with a past medical history of vertigo who presents for altered behavior over the past 3 days found to be altered, unable to follow commands found to be COVID+. Neuro and psych following. Patient admitted for further workup of acute encephalopathy.    1. Acute encephalopathy - resolving.  -unclear etiology. Acute decline over the last several days in setting of antihistamine use (50mg benadryl night prior and 100mg in AM and brompheniramine) though unclear if reached an excess amount of this.   -haldol/ativan PRN; pt has not required any prns  -EEG reviewed and no significant prelim findings  -d/w neuro and plan to obtain MRI head for completeness   -per neuro, low suspicion for autoimmune encephalitis at this time and believe this might be more of an underlying psych issue  -f/u lab work-up:     --vitamin B1, B6, D, E, free T4    --[place]  -f/u psych recs    2. COVID+:  -no indication for remdesivir or dex  -c/w lovenox daily    3. DVT ppx:  -c/w Lovenox daily

## 2023-08-02 NOTE — PROGRESS NOTE ADULT - SUBJECTIVE AND OBJECTIVE BOX
Patient is a 53y old  Female who presents with a chief complaint of altered mental status (01 Aug 2023 07:56)      SUBJECTIVE / OVERNIGHT EVENTS:    No acute events overnight. No longer on 1:1 per psych recommendations. Patient's  clarified that she started having insomnia and pressured speech prior to her taking benadryl 50 x3, but her agitation started shortly after taking the medications. Patient remembers many events over the past few days, including the circumstances surrounding her emergency transport and hospital course. Does not endorse any pain or concerns at this time.    MEDICATIONS  (STANDING):  dextrose 5%. 1000 milliLiter(s) (50 mL/Hr) IV Continuous <Continuous>  dextrose 5%. 1000 milliLiter(s) (100 mL/Hr) IV Continuous <Continuous>  dextrose 50% Injectable 12.5 Gram(s) IV Push once  dextrose 50% Injectable 25 Gram(s) IV Push once  dextrose 50% Injectable 25 Gram(s) IV Push once  enoxaparin Injectable 40 milliGRAM(s) SubCutaneous every 24 hours  glucagon  Injectable 1 milliGRAM(s) IntraMuscular once  insulin lispro (ADMELOG) corrective regimen sliding scale   SubCutaneous Before meals and at bedtime    MEDICATIONS  (PRN):  dextrose Oral Gel 15 Gram(s) Oral once PRN Blood Glucose LESS THAN 70 milliGRAM(s)/deciliter  LORazepam   Injectable 1 milliGRAM(s) IV Push every 6 hours PRN Agitation      CAPILLARY BLOOD GLUCOSE      POCT Blood Glucose.: 125 mg/dL (01 Aug 2023 21:33)  POCT Blood Glucose.: 114 mg/dL (01 Aug 2023 18:15)  POCT Blood Glucose.: 126 mg/dL (01 Aug 2023 12:04)    I&O's Summary      PHYSICAL EXAM:  Vital Signs Last 24 Hrs  T(C): 36.7 (02 Aug 2023 05:15), Max: 36.8 (01 Aug 2023 21:23)  T(F): 98 (02 Aug 2023 05:15), Max: 98.2 (01 Aug 2023 21:23)  HR: 96 (02 Aug 2023 05:15) (82 - 96)  BP: 115/69 (02 Aug 2023 05:15) (106/70 - 115/69)  BP(mean): --  RR: 17 (02 Aug 2023 05:15) (16 - 17)  SpO2: 100% (02 Aug 2023 05:15) (97% - 100%)    Parameters below as of 02 Aug 2023 05:15  Patient On (Oxygen Delivery Method): room air          GENERAL: No acute distress, well-developed  HEAD:  Atraumatic, Normocephalic  EYES: EOMI, PERRLA, conjunctiva and sclera clear  NECK: Supple, no lymphadenopathy, no JVD  CHEST/LUNG: CTAB; No wheezes, rales, or rhonchi  HEART: Regular rate and rhythm; No murmurs, rubs, or gallops  ABDOMEN: Soft, non-tender, non-distended; normal bowel sounds, no organomegaly  EXTREMITIES:  2+ peripheral pulses b/l, No clubbing, cyanosis, or edema  NEUROLOGIC: Following commands. No focal deficits.  PSYCHIATRIC: A+O x3, normal speech, appropriate affect.    LABS:                        12.8   5.52  )-----------( 234      ( 01 Aug 2023 05:13 )             37.9     08-01    142  |  104  |  15  ----------------------------<  120<H>  3.8   |  26  |  0.67    Ca    9.5      01 Aug 2023 05:13  Phos  3.3     08-01  Mg     2.10     08-01    TPro  7.7  /  Alb  4.3  /  TBili  1.4<H>  /  DBili  x   /  AST  23  /  ALT  16  /  AlkPhos  71  08-01    PT/INR - ( 31 Jul 2023 07:11 )   PT: 13.2 sec;   INR: 1.18 ratio         PTT - ( 31 Jul 2023 07:11 )  PTT:31.1 sec      Urinalysis Basic - ( 01 Aug 2023 05:13 )    Color: x / Appearance: x / SG: x / pH: x  Gluc: 120 mg/dL / Ketone: x  / Bili: x / Urobili: x   Blood: x / Protein: x / Nitrite: x   Leuk Esterase: x / RBC: x / WBC x   Sq Epi: x / Non Sq Epi: x / Bacteria: x        Culture - Blood (collected 31 Jul 2023 07:00)  Source: .Blood Blood-Venous  Preliminary Report (01 Aug 2023 13:01):    No growth at 24 hours    Culture - Blood (collected 31 Jul 2023 06:45)  Source: .Blood Blood-Peripheral  Preliminary Report (01 Aug 2023 13:01):    No growth at 24 hours        RADIOLOGY & ADDITIONAL TESTS:  Results Reviewed:   Imaging Personally Reviewed:  Electrocardiogram Personally Reviewed:    COORDINATION OF CARE:  Care Discussed with Consultants/Other Providers [Y/N]:  Prior or Outpatient Records Reviewed [Y/N]:   Patient is a 53y old  Female who presents with a chief complaint of altered mental status (01 Aug 2023 07:56)      SUBJECTIVE / OVERNIGHT EVENTS:    No acute events overnight. Patient states she felt like something was physically "weighing me down" and could not get adequate sleep overnight. No longer on 1:1 per psych recommendations. Patient's  clarified that she started having insomnia and pressured speech prior to her taking benadryl 50 x3, but her agitation started shortly after taking the medications. Patient remembers many events over the past few days, including the circumstances surrounding her emergency transport and hospital course. Does not endorse any pain at this time.    MEDICATIONS  (STANDING):  dextrose 5%. 1000 milliLiter(s) (50 mL/Hr) IV Continuous <Continuous>  dextrose 5%. 1000 milliLiter(s) (100 mL/Hr) IV Continuous <Continuous>  dextrose 50% Injectable 12.5 Gram(s) IV Push once  dextrose 50% Injectable 25 Gram(s) IV Push once  dextrose 50% Injectable 25 Gram(s) IV Push once  enoxaparin Injectable 40 milliGRAM(s) SubCutaneous every 24 hours  glucagon  Injectable 1 milliGRAM(s) IntraMuscular once  insulin lispro (ADMELOG) corrective regimen sliding scale   SubCutaneous Before meals and at bedtime    MEDICATIONS  (PRN):  dextrose Oral Gel 15 Gram(s) Oral once PRN Blood Glucose LESS THAN 70 milliGRAM(s)/deciliter  LORazepam   Injectable 1 milliGRAM(s) IV Push every 6 hours PRN Agitation      CAPILLARY BLOOD GLUCOSE      POCT Blood Glucose.: 125 mg/dL (01 Aug 2023 21:33)  POCT Blood Glucose.: 114 mg/dL (01 Aug 2023 18:15)  POCT Blood Glucose.: 126 mg/dL (01 Aug 2023 12:04)    I&O's Summary      PHYSICAL EXAM:  Vital Signs Last 24 Hrs  T(C): 36.7 (02 Aug 2023 05:15), Max: 36.8 (01 Aug 2023 21:23)  T(F): 98 (02 Aug 2023 05:15), Max: 98.2 (01 Aug 2023 21:23)  HR: 96 (02 Aug 2023 05:15) (82 - 96)  BP: 115/69 (02 Aug 2023 05:15) (106/70 - 115/69)  BP(mean): --  RR: 17 (02 Aug 2023 05:15) (16 - 17)  SpO2: 100% (02 Aug 2023 05:15) (97% - 100%)    Parameters below as of 02 Aug 2023 05:15  Patient On (Oxygen Delivery Method): room air          GENERAL: No acute distress, well-developed  HEAD:  Atraumatic, Normocephalic  EYES: EOMI, PERRLA, conjunctiva and sclera clear  NECK: Supple, no lymphadenopathy, no JVD  CHEST/LUNG: CTAB; No wheezes, rales, or rhonchi  HEART: Regular rate and rhythm; No murmurs, rubs, or gallops  ABDOMEN: Soft, non-tender, non-distended; normal bowel sounds, no organomegaly  EXTREMITIES:  2+ peripheral pulses b/l, No clubbing, cyanosis, or edema  NEUROLOGIC: Following commands. No focal deficits.  PSYCHIATRIC: A+O x3, normal speech, appropriate affect.    LABS:                        12.8   5.52  )-----------( 234      ( 01 Aug 2023 05:13 )             37.9     08-01    142  |  104  |  15  ----------------------------<  120<H>  3.8   |  26  |  0.67    Ca    9.5      01 Aug 2023 05:13  Phos  3.3     08-01  Mg     2.10     08-01    TPro  7.7  /  Alb  4.3  /  TBili  1.4<H>  /  DBili  x   /  AST  23  /  ALT  16  /  AlkPhos  71  08-01    PT/INR - ( 31 Jul 2023 07:11 )   PT: 13.2 sec;   INR: 1.18 ratio         PTT - ( 31 Jul 2023 07:11 )  PTT:31.1 sec      Urinalysis Basic - ( 01 Aug 2023 05:13 )    Color: x / Appearance: x / SG: x / pH: x  Gluc: 120 mg/dL / Ketone: x  / Bili: x / Urobili: x   Blood: x / Protein: x / Nitrite: x   Leuk Esterase: x / RBC: x / WBC x   Sq Epi: x / Non Sq Epi: x / Bacteria: x        Culture - Blood (collected 31 Jul 2023 07:00)  Source: .Blood Blood-Venous  Preliminary Report (01 Aug 2023 13:01):    No growth at 24 hours    Culture - Blood (collected 31 Jul 2023 06:45)  Source: .Blood Blood-Peripheral  Preliminary Report (01 Aug 2023 13:01):    No growth at 24 hours        RADIOLOGY & ADDITIONAL TESTS:  Results Reviewed:   Imaging Personally Reviewed:  Electrocardiogram Personally Reviewed:    COORDINATION OF CARE:  Care Discussed with Consultants/Other Providers [Y/N]:  Prior or Outpatient Records Reviewed [Y/N]:

## 2023-08-02 NOTE — BH CONSULTATION LIAISON PROGRESS NOTE - NSBHATTESTBILLING_PSY_A_CORE
[id number] OBS or IP - moderate complexity OR 35-49 mins
Non-billable
[id number] OBS or IP - moderate complexity OR 35-49 mins

## 2023-08-02 NOTE — BH CONSULTATION LIAISON PROGRESS NOTE - NSBHFUPINTERVALHXFT_PSY_A_CORE
Chart reviewed. No acute events overnight. Patient didn't receive any PRN's overnight.  Patient seen and evaluated at the bedside. She was calm, controlled in NAD, A & O X 3. Patient reported that she is feeling fine except for having episodes of sleep paralysis last night,  where she felt "something heavy sitting on her chest and not letting her move", reporting that it happened multiple times at night. She denied any such previous episodes in the past. She denied VH, AH or paranoia. Denied thought insertion, though broadcasting or ideas of reference. Patient didn't appear to be internally preoccupied or responding to internal stimuli. She denied manic symptoms including euphoria or irritability, grandiosity, flight of ideas etc. She also denied any depressive symptoms. She denied any symptoms of WHITLEY, panic d/o. She also denied SI, HI.  Chart reviewed. No acute events overnight. Patient didn't receive any PRN's overnight.  Patient seen and evaluated at the bedside. She was calm, controlled in NAD, A & O X 3. Patient reported that she is feeling fine except for having episodes of sleep paralysis last night,  where she felt "something heavy sitting on her chest and not letting her move", reporting that it happened multiple times at night. She denied any such previous episodes in the past. Reported that sleep was better the night before yesterday. She denied VH, AH or paranoia. Denied thought insertion, though broadcasting or ideas of reference. Patient didn't appear to be internally preoccupied or responding to internal stimuli. She denied manic symptoms including euphoria or irritability, grandiosity, flight of ideas etc. She also denied any depressive symptoms. She denied any symptoms of WHITLEY, panic d/o. She also denied SI, HI.

## 2023-08-02 NOTE — BH CONSULTATION LIAISON PROGRESS NOTE - NSBHASSESSMENTFT_PSY_ALL_CORE
53 year-old with no psychiatric history, presented for sleep-related complaints, disorganized behavior, rapid decline in mental status along with recent COVID infection. As per collateral, she took 150 mg of diphenhydramine after which her agitation worsened. Rapid decline of cognition with neuro- psychiatric symptoms suggest possible delirium which seems to be improving. Differential is wide, COVID related vs Metabolic vs Iatrogenic. As per  present at the bedside, she started having poor sleep, pressured speech before taking Benadryl.   Labs reviewed in EMR: COVID +, CT Head neg, UA/UTox neg. Vitamin B1, B6, B12, D, E, folate syphilis, ammonia, TSH, (nl) free T4, ESR/CRP, Lyme Ab,  Hep panel, HIV, CMV- Neg.   EBV serologies suggesting past infection  Prelim EEG: Unremarkable     Overall, patient continues to show significant improvement in her mental status, alert and fully oriented.     Plan:  For agitation, for now would recommend Ativan 1mg q6h prn, with an additional 1mg 20 min later.   NO ZYPREXA within 1 hour of IM/IV ativan. Monitor for hypoxia, respiratory depression, excessive sedation  No standing psychotropics at this time  Neuro recs appreciated, f/u final EEG  Rest of the management per primary team   - Dispo: No role for inpatient psych care. Likely outpatient care when medically cleared. 53 year-old with no psychiatric history, presented for sleep-related complaints, disorganized behavior, rapid decline in mental status along with recent COVID infection. As per collateral, she took 150 mg of diphenhydramine after which her agitation worsened. Rapid decline of cognition with neuro- psychiatric symptoms suggest possible delirium which seems to be improving. Differential is wide, COVID related vs Metabolic vs Iatrogenic. As per  present at the bedside, she started having poor sleep, pressured speech before taking Benadryl.   Labs reviewed in EMR: COVID +, CT Head neg, UA/UTox neg. Vitamin B1, B6, B12, D, E, folate syphilis, ammonia, TSH, (nl) free T4, ESR/CRP, Lyme Ab,  Hep panel, HIV, CMV- Neg. [place] Virus- pending  EBV serologies suggesting past infection  Prelim EEG: Unremarkable     Overall, patient continues to show significant improvement in her mental status, alert and fully oriented.     Plan:  For agitation, for now would recommend Ativan 1mg q6h prn, with an additional 1mg 20 min later.   NO ZYPREXA within 1 hour of IM/IV ativan. Monitor for hypoxia, respiratory depression, excessive sedation  No standing psychotropics at this time  Neuro recs appreciated, f/u final EEG  Rest of the management per primary team   - Dispo: No role for inpatient psych care. Likely outpatient care when medically cleared. 53 year-old with no psychiatric history, presented for sleep-related complaints, disorganized behavior, rapid decline in mental status along with recent COVID infection. As per collateral, she took 150 mg of diphenhydramine after which her agitation worsened. Rapid decline of cognition with neuro- psychiatric symptoms suggest possible COVID related Delirium which was superimposed by anticholinergic use  As per , she started having poor sleep, pressured speech before taking Benadryl.   Labs reviewed in EMR: COVID +, CT Head neg, UA/UTox neg. Vitamin B1, B6, B12, D, E, folate syphilis, ammonia, TSH, (nl) free T4, ESR/CRP, Lyme Ab,  Hep panel, HIV, CMV- Neg. [place] Nile Virus- pending  EBV serologies suggesting past infection  Prelim EEG: Unremarkable   Overall, patient continues to show significant improvement in her mental status, alert and fully oriented. She doesn't display any psychotic/manic symptoms, no safety concerns elicited.    Plan:  Start Melatonin 3 mg PO QHS  For agitation, for now would recommend Ativan 1mg q6h prn   NO ZYPREXA within 1 hour of IM/IV ativan. Monitor for hypoxia, respiratory depression, excessive sedation  Neuro recommendations appreciated, f/u final EEG  Rest of the management per primary team   - Dispo: No role for Inpatient Psychiatric care. Likely outpatient care when medically cleared.

## 2023-08-02 NOTE — BH CONSULTATION LIAISON PROGRESS NOTE - NSBHCONSULTFOLLOWAFTERCARE_PSY_A_CORE FT
No role for inpatient psych care. Likely outpatient care when medically cleared. Patient doesn't meet criteria for inpatient psychiatric hospitalization.  [facility], [address] [place], [facility], [address], [place] [id number]. [phone]  [facility]: [phone]

## 2023-08-02 NOTE — EEG REPORT - NS EEG TEXT BOX
GEOVANNI CALVO MRN-[id number] 53y (1970)F  Admitting MD: Dr. Ericka Farrell    Study Date: 08-1 11:10-08:00 8-02-23  x20hrs  --------------------------------------------------------------------------------------------------  History:  CC/ HPI Patient is a 53y old  Female who presents with a chief complaint of altered mental status (02 Aug 2023 07:09)    enoxaparin Injectable 40 milliGRAM(s) SubCutaneous every 24 hours    --------------------------------------------------------------------------------------------------  Study Interpretation:    [[[Abbreviation Key:  PDR=alpha rhythm/posterior dominant rhythm. A-P=anterior posterior gradient.  Amplitude: ‘very low’:<20; ‘low’:20-50; ‘medium’:; ‘high’:>200uV.  Persistence for periodic/rhythmic patterns (% of epoch) ‘rare’:<1%; ‘occasional’:1-10%; ‘frequent’:10-50%; ‘abundant’:50-90%; ‘continuous’:>90%.  Persistence for sporadic discharges: ‘rare’:<1/hr; ‘occasional’:1/min-1/hr; ‘frequent’:>1/min; ‘abundant’:>1/10 sec.  GRDA=generalized rhythmic delta activity; FIRDA=frontal intermittent GRDA; LRDA=lateralized rhythmic delta activity; TIRDA=temporal intermittent rhythmic delta activity;  LPD=PLED=lateralized periodic discharges; GPD=generalized periodic discharges; BiPDs=BiPLEDs=bilateral independent periodic epileptiform discharges; SIRPID=stimulus induced rhythmic, periodic, or ictal appearing discharges; BIRDs=brief potentially ictal rhythmic discharges >4 Hz, lasting .5-10s; PFA (paroxysmal bursts >13 Hz or =8 Hz).  Modifiers: +F=with fast component; +S=with spike component; +R=with rhythmic component.  S-B=burst suppression pattern.  Max=maximal. N1-drowsy; N2-stage II sleep; N3-slow wave sleep. SSS/BETS=small sharp spikes/benign epileptiform transients of sleep. HV=hyperventilation; PS=photic stimulation]]]    FINDINGS:  The background was continuous, spontaneously variable and reactive.  During wakefulness, the posteriorly dominant rhythm consisted of symmetric, well modulated 10 Hz activity, with an amplitude to 40 uV, that attenuated to eye opening.  Low amplitude central beta was noted in wakefulness.    Background Slowing:  Generalized slowing: none was present.  Focal slowing: none was present.    Sleep Background:  -Drowsiness was characterized by fragmentation, attenuation, and slowing of the background activity.    -N2 was characterized by the presence of vertex waves, symmetric spindles, and K-complexes.    Epileptiform Activity:   No interictal epileptiform discharges were present.    Events:  No clinical events were recorded.  No seizures were recorded.    Activation Procedures:   -Hyperventilation was not performed.    -Photic stimulation was performed and did not elicit any abnormalities.      Artifacts:  Intermittent myogenic and external motion artifacts were noted.    ECG:  The heart rate on single channel ECG at baseline was predominantly near BPM = 70-80  -----------------------------------------------------------------------------------------------------    EEG Classification / Summary:  normal EEG study, awake / drowsy / asleep    -  -----------------------------------------------------------------------------------------------------    Clinical Impression:  There were no epileptiform abnormalities recorded.  No seizures x1 day(s)  In absence of additional clinical concerns, recommend consideration for discontinuation of current EEG study with reconnection in future if warranted.    General recommendations for CEEG/LTM duration:  1 Day recording if patient awake and no epileptiform abnormalities recorded.  2 Day recording if patient comatose and no epileptiform abnormalities recorded.  2-3 Day recording if patient comatose and epileptiform abnormalities recorded, with no seizures recorded.  Discontinuation of recording if patient with epileptiform abnormalities or seizures initially on recording, and no subsequent seizures recorded x 1-2 Days.        -------------------------------------------------------------------------------------------------------  [facility] EEG Reading Room Ph#: [phone]  Epilepsy Answering Service after 5PM and before 8:30AM: Ph#: [phone]    Mak Nails M.D.   of Neurology, [facility] Epilepsy [place]	   GEOVANNI CALVO MRN-[id number] 53y (1970)F  Admitting MD: Dr. Ericka Farrell    Study Date: 08-1 11:10-12:43 8-02-23  x24.5hrs  --------------------------------------------------------------------------------------------------  History:  CC/ HPI Patient is a 53y old  Female who presents with a chief complaint of altered mental status (02 Aug 2023 07:09)    enoxaparin Injectable 40 milliGRAM(s) SubCutaneous every 24 hours    --------------------------------------------------------------------------------------------------  Study Interpretation:    [[[Abbreviation Key:  PDR=alpha rhythm/posterior dominant rhythm. A-P=anterior posterior gradient.  Amplitude: ‘very low’:<20; ‘low’:20-50; ‘medium’:; ‘high’:>200uV.  Persistence for periodic/rhythmic patterns (% of epoch) ‘rare’:<1%; ‘occasional’:1-10%; ‘frequent’:10-50%; ‘abundant’:50-90%; ‘continuous’:>90%.  Persistence for sporadic discharges: ‘rare’:<1/hr; ‘occasional’:1/min-1/hr; ‘frequent’:>1/min; ‘abundant’:>1/10 sec.  GRDA=generalized rhythmic delta activity; FIRDA=frontal intermittent GRDA; LRDA=lateralized rhythmic delta activity; TIRDA=temporal intermittent rhythmic delta activity;  LPD=PLED=lateralized periodic discharges; GPD=generalized periodic discharges; BiPDs=BiPLEDs=bilateral independent periodic epileptiform discharges; SIRPID=stimulus induced rhythmic, periodic, or ictal appearing discharges; BIRDs=brief potentially ictal rhythmic discharges >4 Hz, lasting .5-10s; PFA (paroxysmal bursts >13 Hz or =8 Hz).  Modifiers: +F=with fast component; +S=with spike component; +R=with rhythmic component.  S-B=burst suppression pattern.  Max=maximal. N1-drowsy; N2-stage II sleep; N3-slow wave sleep. SSS/BETS=small sharp spikes/benign epileptiform transients of sleep. HV=hyperventilation; PS=photic stimulation]]]    FINDINGS:  The background was continuous, spontaneously variable and reactive.  During wakefulness, the posteriorly dominant rhythm consisted of symmetric, well modulated 10 Hz activity, with an amplitude to 40 uV, that attenuated to eye opening.  Low amplitude central beta was noted in wakefulness.    Background Slowing:  Generalized slowing: none was present.  Focal slowing: none was present.    Sleep Background:  -Drowsiness was characterized by fragmentation, attenuation, and slowing of the background activity.    -N2 was characterized by the presence of vertex waves, symmetric spindles, and K-complexes.    Epileptiform Activity:   No interictal epileptiform discharges were present.    Events:  No clinical events were recorded.  No seizures were recorded.    Activation Procedures:   -Hyperventilation was not performed.    -Photic stimulation was performed and did not elicit any abnormalities.      Artifacts:  Intermittent myogenic and external motion artifacts were noted.    ECG:  The heart rate on single channel ECG at baseline was predominantly near BPM = 70-80  -----------------------------------------------------------------------------------------------------    EEG Classification / Summary:  normal EEG study, awake / drowsy / asleep    -  -----------------------------------------------------------------------------------------------------    Clinical Impression:  There were no epileptiform abnormalities recorded.  No seizures x1 day(s)  In absence of additional clinical concerns, recommend consideration for discontinuation of current EEG study with reconnection in future if warranted.    General recommendations for CEEG/LTM duration:  1 Day recording if patient awake and no epileptiform abnormalities recorded.  2 Day recording if patient comatose and no epileptiform abnormalities recorded.  2-3 Day recording if patient comatose and epileptiform abnormalities recorded, with no seizures recorded.  Discontinuation of recording if patient with epileptiform abnormalities or seizures initially on recording, and no subsequent seizures recorded x 1-2 Days.        -------------------------------------------------------------------------------------------------------  [facility] EEG Reading Room Ph#: [phone]  Epilepsy Answering Service after 5PM and before 8:30AM: Ph#: [phone]    Mak Nails M.D.   of Neurology, [facility]ilepsy [place]

## 2023-08-02 NOTE — PROGRESS NOTE ADULT - PROBLEM SELECTOR PLAN 1
Presenting with insomnia, bizarre behavior, intermittent agitation, disorientation, incoherent/nonsensical speech  Unclear etiology at this time.   - No prior psych/neuro history but underlying psychiatric etiology possible  - Recently discharged on benadryl and brompheniramine - possibly anticholinergic medication induced delirium  - Infectious workup neg at this time; COVID+  - WBC wnl  - Neuro following - encephalitis less likely due to sx <1 week  -  consult appreciated. Possible mood disorder but with rapid decline at this time; holding off on Seroquel for agitation due to possible catatonia  - hold all antihistamines  - bladder scan, monitor for urinary retention. Patient however has been voiding in ED  - CTH neg  - Ativan PRNs  - neuro consult, appreciate recs  - vitamin B1, B6, B12, D, E, folate syphilis, ammonia, TSH, (nl) free T4, ESR/CRP - negative thusfar  - lyme Ab, [place] f/u  - EBV serologies suggesting past infection, CMV neg, hep panel neg, HIV neg  - discuss with neuro if LP recommended or MRI  - UA neg  - BCx NGTD  - preliminary EEG unremarkable - f/u final read

## 2023-08-02 NOTE — BH CONSULTATION LIAISON PROGRESS NOTE - NSBHCHARTREVIEWVS_PSY_A_CORE FT
Vital Signs Last 24 Hrs  T(C): 36.7 (02 Aug 2023 05:15), Max: 36.8 (01 Aug 2023 21:23)  T(F): 98 (02 Aug 2023 05:15), Max: 98.2 (01 Aug 2023 21:23)  HR: 96 (02 Aug 2023 05:15) (82 - 96)  BP: 115/69 (02 Aug 2023 05:15) (106/70 - 115/69)  BP(mean): --  RR: 17 (02 Aug 2023 05:15) (16 - 17)  SpO2: 100% (02 Aug 2023 05:15) (97% - 100%)    Parameters below as of 02 Aug 2023 05:15  Patient On (Oxygen Delivery Method): room air    
Vital Signs Last 24 Hrs  T(C): 36.7 (01 Aug 2023 05:00), Max: 36.8 (31 Jul 2023 21:17)  T(F): 98.1 (01 Aug 2023 05:00), Max: 98.2 (31 Jul 2023 21:17)  HR: 82 (01 Aug 2023 12:09) (74 - 96)  BP: 113/64 (01 Aug 2023 12:09) (100/72 - 118/71)  BP(mean): --  RR: 16 (01 Aug 2023 12:09) (15 - 18)  SpO2: 97% (01 Aug 2023 12:09) (97% - 100%)    Parameters below as of 01 Aug 2023 12:09  Patient On (Oxygen Delivery Method): room air    
Vital Signs Last 24 Hrs  T(C): 37 (31 Jul 2023 11:10), Max: 37.1 (31 Jul 2023 05:32)  T(F): 98.6 (31 Jul 2023 11:10), Max: 98.8 (31 Jul 2023 05:32)  HR: 64 (31 Jul 2023 11:10) (64 - 82)  BP: 124/73 (31 Jul 2023 11:10) (123/91 - 138/75)  BP(mean): --  RR: 16 (31 Jul 2023 11:10) (16 - 18)  SpO2: 99% (31 Jul 2023 11:10) (99% - 100%)    Parameters below as of 31 Jul 2023 11:10  Patient On (Oxygen Delivery Method): room air

## 2023-08-02 NOTE — BH CONSULTATION LIAISON PROGRESS NOTE - NSBHATTESTTYPEVISIT_PSY_A_CORE
On-site Attending with Resident/Fellow/Student and YURY (99XXX codes)
Attending with Resident/Fellow/Student
Resident/Fellow with telephonic supervision

## 2023-08-03 ENCOUNTER — TRANSCRIPTION ENCOUNTER (OUTPATIENT)
Age: 53
End: 2023-08-03

## 2023-08-03 LAB
-  AMIKACIN: SIGNIFICANT CHANGE UP
-  AMOXICILLIN/CLAVULANIC ACID: SIGNIFICANT CHANGE UP
-  AMPICILLIN/SULBACTAM: SIGNIFICANT CHANGE UP
-  AMPICILLIN: SIGNIFICANT CHANGE UP
-  AZTREONAM: SIGNIFICANT CHANGE UP
-  CEFAZOLIN: SIGNIFICANT CHANGE UP
-  CEFEPIME: SIGNIFICANT CHANGE UP
-  CEFOXITIN: SIGNIFICANT CHANGE UP
-  CEFTRIAXONE: SIGNIFICANT CHANGE UP
-  CEFUROXIME: SIGNIFICANT CHANGE UP
-  CIPROFLOXACIN: SIGNIFICANT CHANGE UP
-  ERTAPENEM: SIGNIFICANT CHANGE UP
-  GENTAMICIN: SIGNIFICANT CHANGE UP
-  LEVOFLOXACIN: SIGNIFICANT CHANGE UP
-  MEROPENEM: SIGNIFICANT CHANGE UP
-  NITROFURANTOIN: SIGNIFICANT CHANGE UP
-  PIPERACILLIN/TAZOBACTAM: SIGNIFICANT CHANGE UP
-  TOBRAMYCIN: SIGNIFICANT CHANGE UP
-  TRIMETHOPRIM/SULFAMETHOXAZOLE: SIGNIFICANT CHANGE UP
B BURGDOR DNA SPEC QL NAA+PROBE: NEGATIVE — SIGNIFICANT CHANGE UP
CULTURE RESULTS: SIGNIFICANT CHANGE UP
METHOD TYPE: SIGNIFICANT CHANGE UP
ORGANISM # SPEC MICROSCOPIC CNT: SIGNIFICANT CHANGE UP
ORGANISM # SPEC MICROSCOPIC CNT: SIGNIFICANT CHANGE UP
PYRIDOXAL PHOS SERPL-MCNC: 5.3 UG/L — SIGNIFICANT CHANGE UP (ref 3.4–65.2)
SPECIMEN SOURCE: SIGNIFICANT CHANGE UP

## 2023-08-03 PROCEDURE — 99233 SBSQ HOSP IP/OBS HIGH 50: CPT | Mod: GC

## 2023-08-03 PROCEDURE — 99232 SBSQ HOSP IP/OBS MODERATE 35: CPT | Mod: GC

## 2023-08-03 RX ORDER — BROMPHENIRAMINE MALEATE, DEXTROMETHORPHAN HYDROBROMIDE, PHENYLEPHRINE HYDROCHLORIDE 1; 5; 2.5 MG/5ML; MG/5ML; MG/5ML
10 LIQUID ORAL
Refills: 0 | DISCHARGE

## 2023-08-03 RX ORDER — ACETAMINOPHEN 500 MG
650 TABLET ORAL ONCE
Refills: 0 | Status: COMPLETED | OUTPATIENT
Start: 2023-08-03 | End: 2023-08-03

## 2023-08-03 RX ORDER — LANOLIN ALCOHOL/MO/W.PET/CERES
1 CREAM (GRAM) TOPICAL
Qty: 0 | Refills: 0 | DISCHARGE
Start: 2023-08-03

## 2023-08-03 RX ADMIN — Medication 3 MILLIGRAM(S): at 22:39

## 2023-08-03 RX ADMIN — Medication 650 MILLIGRAM(S): at 21:39

## 2023-08-03 RX ADMIN — Medication 650 MILLIGRAM(S): at 20:39

## 2023-08-03 RX ADMIN — ENOXAPARIN SODIUM 40 MILLIGRAM(S): 100 INJECTION SUBCUTANEOUS at 05:18

## 2023-08-03 NOTE — DISCHARGE NOTE PROVIDER - NSDCCPCAREPLAN_GEN_ALL_CORE_FT
PRINCIPAL DISCHARGE DIAGNOSIS  Diagnosis: Delirium, acute  Assessment and Plan of Treatment: Delirium is a disorder where there is a sudden change in mental state where the brain is not working normally and causes people to be confused. You were found to be in delirium in the hospital and previously at home. Syptoms include trouble paying attention, issues with memory, language, thinking, knowing where you are, what time it is, or who you are with.   Unlike dementia, it is usually reversible and treatable, though it may take days or weeks. It is important to get adequate sleep and rest, stay physically and mentally active, and stay adequately hydrated. Please take all medications as directed. It is also important to follow up with all scheduled doctor appointments.  Return to the ED if you experience confusion, have new fevers or chills, excessive tiredness, high amounts of energy despite little sleep, concerning thoughts or hallucinations, or thoughts of harming yourself or others.      SECONDARY DISCHARGE DIAGNOSES  Diagnosis: COVID-19 virus detected  Assessment and Plan of Treatment: You were recently found to be COVID-19 positive. This is a virus that may cause an infection in your lungs and affect other organs. Most often this causes a respiratory illness with fevers, coughing, and shortness of breath. People with COVID-19 may also not have any noticible symptoms.  As you recover, you must isolate at home to keep people who are not infected with the virus safe. Please take all medications as prescribed. It is also important that you follow up with your scheduled doctor's appointments.  Call 911 or seek immediate medical attention if you experience chest pains, trouble breathing, confusion, seizures, weakness in your limbs, or swelling of your limbs.     PRINCIPAL DISCHARGE DIAGNOSIS  Diagnosis: Delirium, acute  Assessment and Plan of Treatment: Delirium is a disorder where there is a sudden change in mental state where the brain is not working normally and causes people to be confused. You were found to be in delirium in the hospital and previously at home. Syptoms include trouble paying attention, issues with memory, language, thinking, knowing where you are, what time it is, or who you are with.   Unlike dementia, it is usually reversible and treatable, though it may take days or weeks. It is important to get adequate sleep and rest, stay physically and mentally active, and stay adequately hydrated. Please take all medications as directed. It is also important to follow up with all scheduled doctor appointments. An MRI head was performed and you should follow up with neurology to go over the results.   Return to the ED if you experience confusion, have new fevers or chills, excessive tiredness, high amounts of energy despite little sleep, concerning thoughts or hallucinations, or thoughts of harming yourself or others.      SECONDARY DISCHARGE DIAGNOSES  Diagnosis: COVID-19 virus detected  Assessment and Plan of Treatment: You were recently found to be COVID-19 positive. This is a virus that may cause an infection in your lungs and affect other organs. Most often this causes a respiratory illness with fevers, coughing, and shortness of breath. People with COVID-19 may also not have any noticible symptoms.  As you recover, you must isolate at home to keep people who are not infected with the virus safe. Please take all medications as prescribed. It is also important that you follow up with your scheduled doctor's appointments.  Call 911 or seek immediate medical attention if you experience chest pains, trouble breathing, confusion, seizures, weakness in your limbs, or swelling of your limbs.

## 2023-08-03 NOTE — PROGRESS NOTE ADULT - PROBLEM SELECTOR PLAN 3
Pt found to have elevated blood sugar on CMP  -FS premeal and before bedtime  -ISS  -A1C 5.8  - ctm glucose, adequately controlled

## 2023-08-03 NOTE — PROGRESS NOTE ADULT - ATTENDING COMMENTS
Patient seen and examined by me. Case discussed with resident and agree with the resident's findings and plan as documented in the resident's note. 53F with a past medical history of vertigo who presents for altered behavior over the past 3 days found to be altered, unable to follow commands found to be COVID+. Neuro and psych following. Patient admitted for further workup of acute encephalopathy.    1. Acute encephalopathy - resolving.  -unclear etiology. Acute decline over the last several days in setting of antihistamine use (50mg benadryl night prior and 100mg in AM and brompheniramine), however per  patient was exhibiting "odd behavior" prior to getting benadryl such as pressured speech.  -haldol/ativan PRN; pt has not required any prns  -EEG reviewed and no significant prelim findings  -d/w neuro and plan to obtain MRI head for completeness; f/u MRI  -per neuro, low suspicion for autoimmune encephalitis at this time and believe this might be more of an underlying psych issue  -f/u lab work-up:     --vitamin B1, B6, E, free T4  -f/u psych recs    2. COVID+:  -no indication for remdesivir or dex  -c/w lovenox daily    3. DVT ppx:  -c/w Lovenox daily Patient seen and examined by me. Case discussed with resident and agree with the resident's findings and plan as documented in the resident's note. 53F with a past medical history of vertigo who presents for altered behavior over the past 3 days found to be altered, unable to follow commands found to be COVID+. Neuro and psych following. Patient admitted for further workup of acute encephalopathy.    1. Acute encephalopathy - resolving.  -unclear etiology. Acute decline over the last several days in setting of antihistamine use (50mg benadryl night prior and 100mg in AM and brompheniramine), however per  patient was exhibiting "odd behavior" prior to getting benadryl such as pressured speech.  -haldol/ativan PRN; pt has not required any prns  -EEG reviewed and no significant prelim findings  -d/w neuro and plan to obtain MRI head for completeness; f/u MRI  -per neuro, low suspicion for autoimmune encephalitis at this time and believe this might be more of an underlying psych issue  -f/u lab work-up:     --vitamin B1, B6, E, free T4  -f/u psych recs and started on melatonin qhs    2. COVID+:  -no indication for remdesivir or dex  -c/w lovenox daily    3. DVT ppx:  -c/w Lovenox daily

## 2023-08-03 NOTE — DISCHARGE NOTE PROVIDER - HOSPITAL COURSE
Patient is a 53-year-old female with a past medical history of vertigo who presents for altered behavior over the past 3 days. Patient was brought into the ED by  the day prior to admission for insomnia. Pt was given benadryl and d/c home. Per patient's  family went on vacation from July 17-24 to [place]. Day prior to coming back, pt developed a running nose and cough. She tested positive for COVID+ on 7/25. She was also bit by a mosquito.   said pt was very stressed from her job since 7/26-28.  On Friday,  noted that she was talking a lot, overexplaining, apologizing to all children. He also observed that she would lose words, however, denied auditory or visual hallucinations. 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 On Sat, pt ran typical errands, and again in the evening, was speaking more than usual. At this point, pt had not slept for 48 hours and complained of being unable to sleep was brought to the ED where she was given benadryl. Her  reports that after she came back from the ED, she started speaking again in long run on sentences that did not make sense, would forget words and start another sentence. She was also very affectionate to her , hugging and kissing him. In the setting of insomnia, patient's family gave her an addition 100 of Benadryl, for a total of 150. Her agitation increased. Pt was given haldol 5 and ativan 2 in the ED. Patient was admitted for workup of acute encephalopathy.    During her hospital stay, patient was found to initially have incoherent speech and sleepy. She was COVID+, but asymptomatic with no interventions or medications needed. Her acute encephalopathy was thought to be possibly anticholinergic medication-induced delirium, infectious, or neurologic. Psych and neurology were consulted. Psychiatry had low suspicion for psychiatric disorder. Neurology recommended broad encephalitis workup including and vEEG, which all came back unremarkable. MRI Brain was performed, which showed ___. Patient became alert and oriented, with recollection of her hospital course and events leading to her admission, and was back to her baseline per herself and her . Patient is medically stable and ready for discharge.     Patient is a 53-year-old female with a past medical history of vertigo who presents for altered behavior over the past 3 days. Patient was brought into the ED by  the day prior to admission for insomnia. Pt was given benadryl and d/c home. Per patient's  family went on vacation from July 17-24 to [place]. Day prior to coming back, pt developed a running nose and cough. She tested positive for COVID+ on 7/25. She was also bit by a mosquito.   said pt was very stressed from her job since 7/26-28.  On Friday,  noted that she was talking a lot, overexplaining, apologizing to all children. He also observed that she would lose words, however, denied auditory or visual hallucinations. 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 On Sat, pt ran typical errands, and again in the evening, was speaking more than usual. At this point, pt had not slept for 48 hours and complained of being unable to sleep was brought to the ED where she was given benadryl. Her  reports that after she came back from the ED, she started speaking again in long run on sentences that did not make sense, would forget words and start another sentence. She was also very affectionate to her , hugging and kissing him. In the setting of insomnia, patient's family gave her an addition 100 of Benadryl, for a total of 150. Her agitation increased. Pt was given haldol 5 and ativan 2 in the ED. Patient was admitted for workup of acute encephalopathy.    During her hospital stay, patient was found to initially have incoherent speech and sleepy. She was COVID+, but asymptomatic with no interventions or medications needed. Her acute encephalopathy was thought to be possibly anticholinergic medication-induced delirium, infectious, or neurologic. Psych and neurology were consulted. Psychiatry had low suspicion for psychiatric disorder. Neurology recommended broad infectious encephalitis workup including and vEEG, which all came back unremarkable, negative for epileptiform activity. MRI Brain was performed, which showed ___. Patient is now alert and oriented, with recollection of her hospital course and events leading to her admission, and was back to her baseline per herself and her . Patient is medically stable and ready for discharge.     Patient is a 53-year-old female with a past medical history of vertigo who presents for altered behavior over the past 3 days. Patient was brought into the ED by  the day prior to admission for insomnia. Pt was given benadryl and d/c home. Per patient's  family went on vacation from July 17-24 to [place]. Day prior to coming back, pt developed a running nose and cough. She tested positive for COVID+ on 7/25. She was also bit by a mosquito.   said pt was very stressed from her job since 7/26-28.  On Friday,  noted that she was talking a lot, overexplaining, apologizing to all children. He also observed that she would lose words, however, denied auditory or visual hallucinations. 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 On Sat, pt ran typical errands, and again in the evening, was speaking more than usual. At this point, pt had not slept for 48 hours and complained of being unable to sleep was brought to the ED where she was given benadryl. Her  reports that after she came back from the ED, she started speaking again in long run on sentences that did not make sense, would forget words and start another sentence. She was also very affectionate to her , hugging and kissing him. In the setting of insomnia, patient's family gave her an addition 100 of Benadryl, for a total of 150. Her agitation increased. Pt was given haldol 5 and ativan 2 in the ED. Patient was admitted for workup of acute encephalopathy.    During her hospital stay, patient was found to initially have incoherent speech and sleepy. She was COVID+, but asymptomatic with no interventions or medications needed. Her acute encephalopathy was thought to be possibly anticholinergic medication-induced delirium, infectious, or neurologic. Psych and neurology were consulted. Psychiatry had low suspicion for psychiatric disorder. Neurology recommended broad infectious encephalitis workup including and vEEG, which all came back unremarkable, negative for epileptiform activity. MRI Brain was performed. Patient is now alert and oriented, with recollection of her hospital course and events leading to her admission, and was back to her baseline per herself and her . Patient is medically stable and ready for discharge with outpatient neurology follow up.

## 2023-08-03 NOTE — DISCHARGE NOTE PROVIDER - CARE PROVIDER_API CALL
Cristóbal Cole  Internal Medicine  [address] [place] [address]  Phone: ()-  Fax: ()-  Established Patient  Follow Up Time: 2 weeks   Cristóbal Cole  Internal Medicine  [address] [place] [address]  Phone: ()-  Fax: ()-  Established Patient  Follow Up Time: 2 weeks    Landry Verduzco  Neurology  [address] [place] [id number]  Phone: [phone]  Fax: [phone]  Follow Up Time: 1 week

## 2023-08-03 NOTE — PROGRESS NOTE ADULT - SUBJECTIVE AND OBJECTIVE BOX
Patient is a 53y old  Female who presents with a chief complaint of altered mental status (02 Aug 2023 07:09)      SUBJECTIVE / OVERNIGHT EVENTS:    Patient with no acute concerns. She endorses sleeping ___ with melatonin. No acute events overnight.    MEDICATIONS  (STANDING):  enoxaparin Injectable 40 milliGRAM(s) SubCutaneous every 24 hours  melatonin 3 milliGRAM(s) Oral at bedtime    MEDICATIONS  (PRN):  LORazepam   Injectable 1 milliGRAM(s) IV Push every 6 hours PRN Agitation      CAPILLARY BLOOD GLUCOSE        I&O's Summary      PHYSICAL EXAM:  Vital Signs Last 24 Hrs  T(C): 36.6 (02 Aug 2023 13:09), Max: 36.6 (02 Aug 2023 13:09)  T(F): 97.8 (02 Aug 2023 13:09), Max: 97.8 (02 Aug 2023 13:09)  HR: 82 (02 Aug 2023 23:49) (82 - 82)  BP: 101/64 (02 Aug 2023 23:49) (101/64 - 106/62)  BP(mean): --  RR: 18 (02 Aug 2023 23:49) (18 - 18)  SpO2: 100% (02 Aug 2023 23:49) (100% - 100%)    Parameters below as of 02 Aug 2023 23:49  Patient On (Oxygen Delivery Method): room air      GENERAL: No acute distress, well-developed  HEAD:  Atraumatic, Normocephalic  EYES: EOMI, PERRLA, conjunctiva and sclera clear  NECK: Supple, no lymphadenopathy, no JVD  CHEST/LUNG: CTAB; No wheezes, rales, or rhonchi  HEART: Regular rate and rhythm; No murmurs, rubs, or gallops  ABDOMEN: Soft, non-tender, non-distended; normal bowel sounds, no organomegaly  EXTREMITIES:  2+ peripheral pulses b/l, No clubbing, cyanosis, or edema  NEUROLOGIC: Following commands. No focal deficits.  PSYCHIATRIC: A+O x3, normal speech, appropriate affect.  LABS:                        13.0   5.34  )-----------( 235      ( 02 Aug 2023 06:00 )             36.7     08-02    137  |  101  |  16  ----------------------------<  144<H>  3.6   |  24  |  0.72    Ca    9.1      02 Aug 2023 06:00  Phos  4.0     08-02  Mg     2.00     08-02    TPro  7.4  /  Alb  4.0  /  TBili  1.1  /  DBili  x   /  AST  17  /  ALT  14  /  AlkPhos  76  08-02          Urinalysis Basic - ( 02 Aug 2023 06:00 )    Color: x / Appearance: x / SG: x / pH: x  Gluc: 144 mg/dL / Ketone: x  / Bili: x / Urobili: x   Blood: x / Protein: x / Nitrite: x   Leuk Esterase: x / RBC: x / WBC x   Sq Epi: x / Non Sq Epi: x / Bacteria: x        Culture - Urine (collected 31 Jul 2023 10:56)  Source: Clean Catch Clean Catch (Midstream)  Preliminary Report (02 Aug 2023 20:34):    10,000 - 49,000 CFU/mL Proteus mirabilis    <10,000 CFU/ml Normal Urogenital solomon present        RADIOLOGY & ADDITIONAL TESTS:  Results Reviewed:   Imaging Personally Reviewed:  Electrocardiogram Personally Reviewed:    COORDINATION OF CARE:  Care Discussed with Consultants/Other Providers [Y/N]:  Prior or Outpatient Records Reviewed [Y/N]:   Patient is a 53y old  Female who presents with a chief complaint of altered mental status (02 Aug 2023 07:09)      SUBJECTIVE / OVERNIGHT EVENTS:    Patient with no acute concerns. She endorses sleeping well with melatonin. No acute events overnight.    MEDICATIONS  (STANDING):  enoxaparin Injectable 40 milliGRAM(s) SubCutaneous every 24 hours  melatonin 3 milliGRAM(s) Oral at bedtime    MEDICATIONS  (PRN):  LORazepam   Injectable 1 milliGRAM(s) IV Push every 6 hours PRN Agitation      CAPILLARY BLOOD GLUCOSE        I&O's Summary      PHYSICAL EXAM:  Vital Signs Last 24 Hrs  T(C): 36.6 (02 Aug 2023 13:09), Max: 36.6 (02 Aug 2023 13:09)  T(F): 97.8 (02 Aug 2023 13:09), Max: 97.8 (02 Aug 2023 13:09)  HR: 82 (02 Aug 2023 23:49) (82 - 82)  BP: 101/64 (02 Aug 2023 23:49) (101/64 - 106/62)  BP(mean): --  RR: 18 (02 Aug 2023 23:49) (18 - 18)  SpO2: 100% (02 Aug 2023 23:49) (100% - 100%)    Parameters below as of 02 Aug 2023 23:49  Patient On (Oxygen Delivery Method): room air      GENERAL: No acute distress, well-developed  HEAD:  Atraumatic, Normocephalic  EYES: EOMI, PERRLA, conjunctiva and sclera clear  NECK: Supple, no lymphadenopathy, no JVD  CHEST/LUNG: CTAB; No wheezes, rales, or rhonchi  HEART: Regular rate and rhythm; No murmurs, rubs, or gallops  ABDOMEN: Soft, non-tender, non-distended; normal bowel sounds, no organomegaly  EXTREMITIES:  2+ peripheral pulses b/l, No clubbing, cyanosis, or edema  NEUROLOGIC: Following commands. No focal deficits.  PSYCHIATRIC: A+O x3, normal speech, appropriate affect.  LABS:                        13.0   5.34  )-----------( 235      ( 02 Aug 2023 06:00 )             36.7     08-02    137  |  101  |  16  ----------------------------<  144<H>  3.6   |  24  |  0.72    Ca    9.1      02 Aug 2023 06:00  Phos  4.0     08-02  Mg     2.00     08-02    TPro  7.4  /  Alb  4.0  /  TBili  1.1  /  DBili  x   /  AST  17  /  ALT  14  /  AlkPhos  76  08-02          Urinalysis Basic - ( 02 Aug 2023 06:00 )    Color: x / Appearance: x / SG: x / pH: x  Gluc: 144 mg/dL / Ketone: x  / Bili: x / Urobili: x   Blood: x / Protein: x / Nitrite: x   Leuk Esterase: x / RBC: x / WBC x   Sq Epi: x / Non Sq Epi: x / Bacteria: x        Culture - Urine (collected 31 Jul 2023 10:56)  Source: Clean Catch Clean Catch (Midstream)  Preliminary Report (02 Aug 2023 20:34):    10,000 - 49,000 CFU/mL Proteus mirabilis    <10,000 CFU/ml Normal Urogenital solomon present        RADIOLOGY & ADDITIONAL TESTS:  Results Reviewed:   Imaging Personally Reviewed:  Electrocardiogram Personally Reviewed:    COORDINATION OF CARE:  Care Discussed with Consultants/Other Providers [Y/N]:  Prior or Outpatient Records Reviewed [Y/N]:

## 2023-08-03 NOTE — DISCHARGE NOTE PROVIDER - PROVIDER TOKENS
PROVIDER:[TOKEN:[[id number]],FOLLOWUP:[2 weeks],ESTABLISHEDPATIENT:[T]] PROVIDER:[TOKEN:[[id number]],FOLLOWUP:[2 weeks],ESTABLISHEDPATIENT:[T]],PROVIDER:[TOKEN:[[id number]],FOLLOWUP:[1 week]]

## 2023-08-03 NOTE — PROGRESS NOTE ADULT - SUBJECTIVE AND OBJECTIVE BOX
[id number]  Patient is a 53y old  Female who presents with a chief complaint of altered mental status (03 Aug 2023 07:13)    Subjective: There were no overnight events noted.       Objective:   MEDICATIONS  (STANDING):  enoxaparin Injectable 40 milliGRAM(s) SubCutaneous every 24 hours  melatonin 3 milliGRAM(s) Oral at bedtime    MEDICATIONS  (PRN):  LORazepam   Injectable 1 milliGRAM(s) IV Push every 6 hours PRN Agitation    Vital Signs Last 24 Hrs  T(C): 36.6 (02 Aug 2023 13:09), Max: 36.6 (02 Aug 2023 13:09)  T(F): 97.8 (02 Aug 2023 13:09), Max: 97.8 (02 Aug 2023 13:09)  HR: 82 (02 Aug 2023 23:49) (82 - 82)  BP: 101/64 (02 Aug 2023 23:49) (101/64 - 106/62)  BP(mean): --  RR: 18 (02 Aug 2023 23:49) (18 - 18)  SpO2: 100% (02 Aug 2023 23:49) (100% - 100%)    Parameters below as of 02 Aug 2023 23:49  Patient On (Oxygen Delivery Method): room air        GENERAL EXAM:  Constitutional: awake and alert. NAD  HEENT: PERRL EOMI  Musculoskeletal: no joint swelling/tenderness, no abnormal movements  Skin: no rashes    NEUROLOGICAL EXAM:  MS: AAOX3, fluent, attends b/l; recent and remote memory intact; normal attention, language and fund of knowledge.   CN: VFF, EOMI, PERR  V1-3 intact, no facial asymmetry, t/p midline, SCM/trap intact.  Motor: Strength: 5/5 4x. Tone: normal. Bulk: normal.   DTR 2+ symm.    Plantar flex b/l.   Sensation: intact  4x.   Coordination: FTN intact b/l.      Labs:   cbc                      13.0   5.34  )-----------( 235      ( 02 Aug 2023 06:00 )             36.7     [id number]    137  |  101  |  16  ----------------------------<  144<H>  3.6   |  24  |  0.72    Ca    9.1      02 Aug 2023 06:00  Phos  4.0     08-02  Mg     2.00     08-02    TPro  7.4  /  Alb  4.0  /  TBili  1.1  /  DBili  x   /  AST  17  /  ALT  14  /  AlkPhos  76  08-02  LFTsLIVER FUNCTIONS - ( 02 Aug 2023 06:00 )  Alb: 4.0 g/dL / Pro: 7.4 g/dL / ALK PHOS: 76 U/L / ALT: 14 U/L / AST: 17 U/L / GGT: x           UAUrinalysis Basic - ( 02 Aug 2023 06:00 )    Color: x / Appearance: x / SG: x / pH: x  Gluc: 144 mg/dL / Ketone: x  / Bili: x / Urobili: x   Blood: x / Protein: x / Nitrite: x   Leuk Esterase: x / RBC: x / WBC x   Sq Epi: x / Non Sq Epi: x / Bacteria: x      Radiology:  CT head w/o contrast:   IMPRESSION:  No acute intracranial hemorrhage, mass effect, edema or midline shift.      EEG:   EEG Classification / Summary:  normal EEG study, awake / drowsy / asleep    -  -----------------------------------------------------------------------------------------------------    Clinical Impression:  There were no epileptiform abnormalities recorded.  No seizures x1 day(s)  In absence of additional clinical concerns, recommend consideration for discontinuation of current EEG study with reconnection in future if warranted.

## 2023-08-03 NOTE — PROGRESS NOTE ADULT - PROBLEM SELECTOR PLAN 1
Presenting with insomnia, bizarre behavior, intermittent agitation, disorientation, incoherent/nonsensical speech  Unclear etiology at this time.   - No prior psych/neuro history but underlying psychiatric etiology possible  - Recently discharged on benadryl and brompheniramine - possibly anticholinergic medication induced delirium  - Infectious workup neg at this time; COVID+  - WBC wnl  - Neuro following - encephalitis less likely due to sx <1 week  -  consult appreciated. Possible mood disorder but with rapid decline at this time; holding off on Seroquel for agitation due to possible catatonia  - hold all antihistamines  - CTH neg  - Ativan PRNs  - neuro consult, appreciate recs  - vitamin B1, B6, B12, D, E, folate syphilis, ammonia, TSH, (nl) free T4, ESR/CRP - negative   - lyme Ab, [place] negative  - EBV serologies suggesting past infection, CMV neg, hep panel neg, HIV neg  - UA neg  - BCx NGTD  - vEEG unremarkable  - MRI Brain per neuro Presenting with insomnia, bizarre behavior, intermittent agitation, disorientation, incoherent/nonsensical speech  Unclear etiology at this time.   - No prior psych/neuro history but underlying psychiatric etiology possible  - Recently discharged on benadryl and brompheniramine - possibly anticholinergic medication induced delirium  - Infectious workup neg at this time; COVID+  - WBC wnl  - Neuro following - encephalitis less likely due to sx <1 week  -  consult appreciated. Possible mood disorder but with rapid decline at this time; holding off on Seroquel for agitation due to possible catatonia  - hold all antihistamines  - CTH neg  - Ativan PRNs  - neuro consult, appreciate recs  - vitamin B1, B6, B12, D, E, folate syphilis, ammonia, TSH, (nl) free T4, ESR/CRP - negative   - lyme Ab, [place] negative  - EBV serologies suggesting past infection, CMV neg, hep panel neg, HIV neg  - UA neg, UCx NGTD  - BCx NGTD  - vEEG unremarkable  - MRI Brain per neuro

## 2023-08-03 NOTE — DISCHARGE NOTE PROVIDER - NSDCCPTREATMENT_GEN_ALL_CORE_FT
PRINCIPAL PROCEDURE  Procedure: EEG awake and asleep  Findings and Treatment: 8/2/23  Clinical Impression:  There were no epileptiform abnormalities recorded.  No seizures x1 day(s)  In absence of additional clinical concerns, recommend consideration for discontinuation of current EEG study with reconnection in future if warranted.

## 2023-08-03 NOTE — PROGRESS NOTE ADULT - ASSESSMENT
53y F with recent diagnosis of COVID (mild URI symptoms), who presents w/ CC of insomnia/AMS    Impression: fluctuating mental status i/s/o poor sleep likely 2/2 to toxic metabolic etiology given hx of covid exposure   Less likely primary neurological etiology   Recommendations:  [x] video EEG for 24h- negative   [] check vitamin B1, B6, B12, D, E, folate, TSH, free T4, ESR/CRP  [] delirium precautions and sleep hygiene  [] neuro checks per routine  [] psychiatry following, appreciate recs  [] will continue to follow with you  [] rest of care per primary team    Case discussed w/ attending Dr. Verduzco.

## 2023-08-03 NOTE — PROGRESS NOTE ADULT - ATTENDING COMMENTS
Patient  seen and examined at bedside. Patient reported she is feeling much better and ate breakfast without any difficulty. Also reported last night, she slept well.   Detailed neuro exam:  General: Patient is comfortably lying on the bed without acute distress.  Mental and Psychological Status: The patient is alert and oriented to person, place and exact date. Follows simple and complex commands. Speech is fluent, comprehension, naming and repetition are intact. Relates personal medical history clearly and with detail.  Cranial Nerve Exam: Visual fields are full to confrontation. Pupils are round, equal, and reactive. Extraocular movements are full. V1-V3 are intact to light touch. There is no facial weakness or asymmetry. Hearing is grossly intact. Palate elevation is symmetric. Shoulder shrug is 5/5 bilaterally. Tongue protrusion is midline. There is no dysarthria.  Motor Exam: Bulk, tone, and strength are normal. There is no pronator drift. There are no resting tremors.  Sensory Examination: Sensation is intact to light touch throughout.  Deep Tendon Reflexes and Plantar Responses: 2+ throughout.   Posture, Station and Gait: Station and gait, including heel and toe walking are normal. Posture are normal.  Coordination: There is no dysmetria or ataxia with finger-nose-finger or heel-knee-shin. No intention tremors are present.                                                                                                                        I agree with above exam, assessment and plan unless noted below,  Ms. Sutton is a 53 year old right handed  woman with PMHx of recent diagnosed of COVID 19 infection who was brought to [facility] ER due to the altered mental status and insomnia. Etiology of AMS is uncertain but due to the metabolic encephalopathy versus delirium due to the infection and insomnia. Clinically I have less suspicious for ischemic stroke, seizure and encephalitis. During my assessment, patient was very pleasant, cooperative and feeling much better.   EEG did not showed any seizure or epileptiform potentials.   MRI brain is pending.   I discussed the diagnosis, treatment plan with the patient.  All questions and concerns were addressed. The patient demonstrated good understanding of the treatment plan.  If you have any further questions, please do not hesitate to contact our team.  Thank you for allowing us to participate in this patient care.

## 2023-08-04 ENCOUNTER — TRANSCRIPTION ENCOUNTER (OUTPATIENT)
Age: 53
End: 2023-08-04

## 2023-08-04 VITALS
TEMPERATURE: 98 F | RESPIRATION RATE: 18 BRPM | OXYGEN SATURATION: 100 % | DIASTOLIC BLOOD PRESSURE: 60 MMHG | HEART RATE: 88 BPM | SYSTOLIC BLOOD PRESSURE: 103 MMHG

## 2023-08-04 PROCEDURE — 70553 MRI BRAIN STEM W/O & W/DYE: CPT | Mod: 26

## 2023-08-04 PROCEDURE — 99239 HOSP IP/OBS DSCHRG MGMT >30: CPT

## 2023-08-04 RX ORDER — LANOLIN ALCOHOL/MO/W.PET/CERES
1 CREAM (GRAM) TOPICAL
Qty: 30 | Refills: 0
Start: 2023-08-04 | End: 2023-09-02

## 2023-08-04 RX ADMIN — ENOXAPARIN SODIUM 40 MILLIGRAM(S): 100 INJECTION SUBCUTANEOUS at 05:34

## 2023-08-04 NOTE — DISCHARGE NOTE NURSING/CASE MANAGEMENT/SOCIAL WORK - NSDCPEFALRISK_GEN_ALL_CORE
For information on Fall & Injury Prevention, visit: [email or web address] OR  [email or web address] OR  https://www.cdc.gov/steadi/patient.html

## 2023-08-04 NOTE — PROGRESS NOTE ADULT - PROBLEM SELECTOR PROBLEM 3
Blood glucose elevated

## 2023-08-04 NOTE — DISCHARGE NOTE NURSING/CASE MANAGEMENT/SOCIAL WORK - PATIENT PORTAL LINK FT
You can access the FollowMyHealth Patient Portal offered by [facility] by registering at the following website: [email or web address]. By joining [facility]’s FollowMyHealth portal, you will also be able to view your health information using other applications (apps) compatible with our system.

## 2023-08-04 NOTE — PROGRESS NOTE ADULT - SUBJECTIVE AND OBJECTIVE BOX
Patient is a 53y old  Female who presents with a chief complaint of altered mental status (03 Aug 2023 17:55)      SUBJECTIVE / OVERNIGHT EVENTS:    Patient has no acute concerns. Sleeping well. Patient seen and examined at bedside    MEDICATIONS  (STANDING):  enoxaparin Injectable 40 milliGRAM(s) SubCutaneous every 24 hours  melatonin 3 milliGRAM(s) Oral at bedtime    MEDICATIONS  (PRN):  LORazepam   Injectable 1 milliGRAM(s) IV Push every 6 hours PRN Agitation      CAPILLARY BLOOD GLUCOSE        I&O's Summary      PHYSICAL EXAM:  Vital Signs Last 24 Hrs  T(C): 36.5 (03 Aug 2023 22:13), Max: 36.5 (03 Aug 2023 13:04)  T(F): 97.7 (03 Aug 2023 22:13), Max: 97.7 (03 Aug 2023 13:04)  HR: 82 (03 Aug 2023 22:13) (81 - 82)  BP: 109/63 (03 Aug 2023 22:13) (102/62 - 109/63)  BP(mean): --  RR: 18 (03 Aug 2023 22:13) (17 - 18)  SpO2: 100% (03 Aug 2023 22:13) (99% - 100%)    Parameters below as of 03 Aug 2023 22:13  Patient On (Oxygen Delivery Method): room air        GENERAL: No acute distress, well-developed  HEAD:  Atraumatic, Normocephalic  EYES: EOMI, PERRLA, conjunctiva and sclera clear  NECK: Supple, no lymphadenopathy, no JVD  CHEST/LUNG: CTAB; No wheezes, rales, or rhonchi  HEART: Regular rate and rhythm; No murmurs, rubs, or gallops  ABDOMEN: Soft, non-tender, non-distended; normal bowel sounds, no organomegaly  EXTREMITIES:  2+ peripheral pulses b/l, No clubbing, cyanosis, or edema  NEUROLOGY: A&O x 3, no focal deficits  SKIN: No rashes or lesions    LABS:                      RADIOLOGY & ADDITIONAL TESTS:  Results Reviewed:   Imaging Personally Reviewed:  Electrocardiogram Personally Reviewed:    COORDINATION OF CARE:  Care Discussed with Consultants/Other Providers [Y/N]:  Prior or Outpatient Records Reviewed [Y/N]:   Patient is a 53y old  Female who presents with a chief complaint of altered mental status (03 Aug 2023 17:55)      SUBJECTIVE / OVERNIGHT EVENTS:    Patient has no acute concerns. Sleeping well. Patient seen and examined at bedside    MEDICATIONS  (STANDING):  enoxaparin Injectable 40 milliGRAM(s) SubCutaneous every 24 hours  melatonin 3 milliGRAM(s) Oral at bedtime    MEDICATIONS  (PRN):  LORazepam   Injectable 1 milliGRAM(s) IV Push every 6 hours PRN Agitation      CAPILLARY BLOOD GLUCOSE        I&O's Summary      PHYSICAL EXAM:  Vital Signs Last 24 Hrs  T(C): 36.5 (03 Aug 2023 22:13), Max: 36.5 (03 Aug 2023 13:04)  T(F): 97.7 (03 Aug 2023 22:13), Max: 97.7 (03 Aug 2023 13:04)  HR: 82 (03 Aug 2023 22:13) (81 - 82)  BP: 109/63 (03 Aug 2023 22:13) (102/62 - 109/63)  BP(mean): --  RR: 18 (03 Aug 2023 22:13) (17 - 18)  SpO2: 100% (03 Aug 2023 22:13) (99% - 100%)    Parameters below as of 03 Aug 2023 22:13  Patient On (Oxygen Delivery Method): room air        GENERAL: No acute distress, well-developed  HEAD:  Atraumatic, Normocephalic  EYES: EOMI, PERRLA, conjunctiva and sclera clear  NECK: Supple, no lymphadenopathy, no JVD  CHEST/LUNG: CTAB; No wheezes, rales, or rhonchi  HEART: Regular rate and rhythm; No murmurs, rubs, or gallops  ABDOMEN: Soft, non-tender, non-distended; normal bowel sounds, no organomegaly  EXTREMITIES:  2+ peripheral pulses b/l, No clubbing, cyanosis, or edema  NEUROLOGY: A&O x 3, no focal deficits,  SKIN: No rashes or lesions    LABS:                      RADIOLOGY & ADDITIONAL TESTS:  Results Reviewed:   Imaging Personally Reviewed:  Electrocardiogram Personally Reviewed:    COORDINATION OF CARE:  Care Discussed with Consultants/Other Providers [Y/N]:  Prior or Outpatient Records Reviewed [Y/N]:

## 2023-08-04 NOTE — PROGRESS NOTE ADULT - TIME BILLING
Time-based billing (NON-critical care).     [48] minutes spent on total encounter; more than 50% of the visit was spent counseling and / or coordinating care by the attending physician.  The necessity of the time spent during the encounter on this date of service was due to:     documentation in [place], reviewing chart and coordinating care with patient/resident and interdisciplinary staff (such as , social workers, etc) as well as reviewing vitals, laboratory data, radiology, medication list, consultants' recommendations and prior records. Interventions were performed as documented above.
Time-based billing (NON-critical care).     More than 50% of the visit was spent counseling and / or coordinating care by the attending physician.      The necessity of the time spent during the encounter on this date of service was due to: documentation in [place], reviewing chart and coordinating care with patient/resident and interdisciplinary staff (such as , social workers, etc) as well as reviewing vitals, laboratory data, radiology, medication list, consultants' recommendations and prior records. Interventions were performed as documented above.
Time-based billing (NON-critical care).     More than 50% of the visit was spent counseling and / or coordinating care by the attending physician.      The necessity of the time spent during the encounter on this date of service was due to: documentation in [place], reviewing chart and coordinating care with patient/resident and interdisciplinary staff (such as , social workers, etc) as well as reviewing vitals, laboratory data, radiology, medication list, consultants' recommendations and prior records. Interventions were performed as documented above.
Time-based billing (NON-critical care).     [48] minutes spent on total encounter; more than 50% of the visit was spent counseling and / or coordinating care by the attending physician.  The necessity of the time spent during the encounter on this date of service was due to:     documentation in [place], reviewing chart and coordinating care with patient/resident and interdisciplinary staff (such as , social workers, etc) as well as reviewing vitals, laboratory data, radiology, medication list, consultants' recommendations and prior records. Interventions were performed as documented above.
Time-based billing (NON-critical care).     [48] minutes spent on total encounter; more than 50% of the visit was spent counseling and / or coordinating care by the attending physician.  The necessity of the time spent during the encounter on this date of service was due to:     documentation in [place], reviewing chart and coordinating care with patient/resident and interdisciplinary staff (such as , social workers, etc) as well as reviewing vitals, laboratory data, radiology, medication list, consultants' recommendations and prior records. Interventions were performed as documented above.

## 2023-08-04 NOTE — PROGRESS NOTE ADULT - PROBLEM SELECTOR PLAN 1
Presenting with insomnia, bizarre behavior, intermittent agitation, disorientation, incoherent/nonsensical speech  Unclear etiology at this time.   - No prior psych/neuro history but underlying psychiatric etiology possible  - Recently discharged on benadryl and brompheniramine - possibly anticholinergic medication induced delirium  - Infectious workup neg at this time; COVID+  - WBC wnl  - Neuro following - encephalitis less likely due to sx <1 week  -  consult appreciated. Possible mood disorder but with rapid decline at this time; holding off on Seroquel for agitation due to possible catatonia  - hold all antihistamines  - CTH neg  - Ativan PRNs  - neuro consult, appreciate recs  - vitamin B1, B6, B12, D, E, folate syphilis, ammonia, TSH, (nl) free T4, ESR/CRP - negative   - lyme Ab, [place] negative  - EBV serologies suggesting past infection, CMV neg, hep panel neg, HIV neg  - UA neg, UCx NGTD  - BCx NGTD  - vEEG unremarkable  - MRI Brain per neuro

## 2023-08-04 NOTE — PROGRESS NOTE ADULT - ATTENDING COMMENTS
Patient seen and examined by me. Case discussed with resident and agree with the resident's findings and plan as documented in the resident's note. 53F with a past medical history of vertigo who presents for altered behavior over the past 3 days found to be altered, unable to follow commands found to be COVID+. Neuro and psych following. Patient admitted for further workup of acute encephalopathy.    1. Acute encephalopathy - resolving.  -unclear etiology. Acute decline over the last several days in setting of antihistamine use (50mg benadryl night prior and 100mg in AM and brompheniramine), however per  patient was exhibiting "odd behavior" prior to getting benadryl such as pressured speech.  -haldol/ativan PRN; pt has not required any prns  -EEG reviewed and no significant findings  -d/w neuro and plan to obtain MRI head for completeness; f/u MRI  -per neuro, low suspicion for autoimmune encephalitis at this time and believe this might be more of an underlying psych issue  -f/u lab work-up:     --vitamin B1, B6, E, free T4  -f/u psych recs and started on melatonin qhs    2. COVID+:  -no indication for remdesivir or dex  -c/w lovenox daily    3. DVT ppx:  -c/w Lovenox daily]    4. Dispo:  -possible d/c today if MRI completed   -see discharge summary in [place]

## 2023-08-05 LAB
CULTURE RESULTS: SIGNIFICANT CHANGE UP
CULTURE RESULTS: SIGNIFICANT CHANGE UP
SPECIMEN SOURCE: SIGNIFICANT CHANGE UP
SPECIMEN SOURCE: SIGNIFICANT CHANGE UP

## 2023-08-07 LAB
A-TOCOPHEROL VIT E SERPL-MCNC: 7.7 MG/L — SIGNIFICANT CHANGE UP (ref 7–25.1)
BETA+GAMMA TOCOPHEROL SERPL-MCNC: 1.1 MG/L — SIGNIFICANT CHANGE UP (ref 0.5–5.5)
VIT B1 SERPL-MCNC: 84.4 NMOL/L — SIGNIFICANT CHANGE UP (ref 66.5–200)

## 2023-08-08 ENCOUNTER — TRANSCRIPTION ENCOUNTER (OUTPATIENT)
Age: 53
End: 2023-08-08

## 2023-08-08 PROBLEM — R42 DIZZINESS AND GIDDINESS: Chronic | Status: ACTIVE | Noted: 2023-07-29

## 2023-08-14 ENCOUNTER — APPOINTMENT (OUTPATIENT)
Dept: NEUROLOGY | Facility: CLINIC | Age: 53
End: 2023-08-14
Payer: COMMERCIAL

## 2023-08-14 PROCEDURE — 99213 OFFICE O/P EST LOW 20 MIN: CPT | Mod: 95

## 2023-08-14 RX ORDER — GLUCOSAMINE HCL/CHONDROITIN SU 500-400 MG
3 CAPSULE ORAL
Qty: 30 | Refills: 0 | Status: ACTIVE | COMMUNITY
Start: 2023-08-04

## 2023-08-14 RX ORDER — BROMPHENIRAMINE MALEATE, PSEUDOEPHEDRINE HYDROCHLORIDE, 2; 30; 10 MG/5ML; MG/5ML; MG/5ML
30-2-10 SYRUP ORAL
Qty: 210 | Refills: 0 | Status: ACTIVE | COMMUNITY
Start: 2023-07-25

## 2023-08-14 RX ORDER — IBUPROFEN 600 MG/1
600 TABLET, FILM COATED ORAL
Qty: 28 | Refills: 0 | Status: ACTIVE | COMMUNITY
Start: 2023-06-27

## 2023-08-14 RX ORDER — SALINE NASAL SPRAY 1.5 OZ
0.65 SOLUTION NASAL
Qty: 44 | Refills: 0 | Status: ACTIVE | COMMUNITY
Start: 2023-07-25

## 2023-08-14 RX ORDER — FLUTICASONE PROPIONATE 50 UG/1
50 SPRAY, METERED NASAL
Qty: 16 | Refills: 0 | Status: ACTIVE | COMMUNITY
Start: 2023-07-25

## 2023-08-14 RX ORDER — BENZONATATE 100 MG/1
100 CAPSULE ORAL
Qty: 15 | Refills: 0 | Status: ACTIVE | COMMUNITY
Start: 2023-06-27

## 2023-08-14 NOTE — REASON FOR VISIT
[Home] : at home, [unfilled] , at the time of the visit. [Medical Office: ([facility])___] : at the medical office located in  [Post Hospitalization] : a post hospitalization visit [Spouse] : spouse

## 2023-08-14 NOTE — ASSESSMENT
[FreeTextEntry1] : 53-year-old woman with recent admission to [facility] for what sounds like either encephalopathy due to Covid and complicated by Benadryl use, or perhaps a first-time manic episode.  In either case, she is currently back to baseline and I would not recommend any additional neurologic work up or treatment at this time.  Would consider a second psychiatric opinion to see whether they think this could be a first presentation of soo, but suspect they would not be able to come to a conclusive diagnosis and would recommend continuing to monitor for now.

## 2023-08-14 NOTE — HISTORY OF PRESENT ILLNESS
[FreeTextEntry1] : 53-year-old woman presenting with  for follow up after recent hospitalization.  Several weeks ago she had Covid and in the setting of this developed severe insomnia.  She was not able to sleep at all for 2 days, presented to urgent care and was given Benadryl.  She was still unable to sleep and after consulting with her 's friend who is a physician she took several more Benadryl.  In addition to not being able to sleep she felt like her thoughts were racing and her  noticed that her speech was a little faster than usual.  No hallucinations, delusions, risky/impulsive behavior.  Had never had anything like this before.   No history of depression.  After the additional Benadryl she became confused, speaking much less, not making sense, and returned to ED and was admitted to neurology service at [facility].  She was evaluated by psychiatry team, who did not think this was a manic episode.  MRI and EEG were unremarkable.  Benadryl was held and she gradually returned to baseline and was discharged with a diagnosis of acute delirium.

## 2023-08-14 NOTE — PHYSICAL EXAM
[FreeTextEntry1] : General: no apparent distress Mental Status: awake and alert, speech fluent and prosodic with no paraphasic errors Cranial Nerves: tracks in all directions, face symmetric, no dysarthria

## 2023-08-14 NOTE — DATA REVIEWED
[de-identified] : MRI brain 8/4/2023 independently reviewed, normal [de-identified] : Inpatient psychiatry and neurology notes reviewed

## 2023-09-25 NOTE — ED PROVIDER NOTE - CONSTITUTIONAL MENTATION
awake/alert Doxycycline Pregnancy And Lactation Text: This medication is Pregnancy Category D and not consider safe during pregnancy. It is also excreted in breast milk but is considered safe for shorter treatment courses.

## 2023-09-25 NOTE — ED PROVIDER NOTE - CPE EDP NEURO NORM
Detail Level: Detailed Was A Bandage Applied: Yes Punch Size In Mm: 4 Size Of Lesion In Cm (Optional): 0 Depth Of Punch Biopsy: dermis Biopsy Type: H and E Anesthesia Type: 1% lidocaine with epinephrine Anesthesia Volume In Cc (Will Not Render If 0): 0.5 Hemostasis: None Epidermal Sutures: 4-0 Prolene Wound Care: Vaseline Dressing: bandage Suture Removal: 10 days Patient Will Remove Sutures At Home?: No Lab: 540 Lab Facility: [id number] Consent: Written consent was obtained and risks were reviewed including but not limited to scarring, infection, bleeding, scabbing, incomplete removal, nerve damage and allergy to anesthesia. Post-Care Instructions: I reviewed with the patient in detail post-care instructions. Patient is to keep the biopsy site dry overnight, and then apply bacitracin twice daily until healed. Patient may apply hydrogen peroxide soaks to remove any crusting. Home Suture Removal Text: Patient was provided a home suture removal kit and will remove their sutures at home.  If they have any questions or difficulties they will call the office. Notification Instructions: Patient will be notified of biopsy results. However, patient instructed to call the office if not contacted within 2 weeks. Billing Type: Third-Party Bill Information: Selecting Yes will display possible errors in your note based on the variables you have selected. This validation is only offered as a suggestion for you. PLEASE NOTE THAT THE VALIDATION TEXT WILL BE REMOVED WHEN YOU FINALIZE YOUR NOTE. IF YOU WANT TO FAX A PRELIMINARY NOTE YOU WILL NEED TO TOGGLE THIS TO 'NO' IF YOU DO NOT WANT IT IN YOUR FAXED NOTE. X Size Of Lesion In Cm (Optional): 0.4 Lab: 540 Lab Facility: [id number] Home Suture Removal Text: Patient was provided a home suture removal kit and will remove their sutures at home.  If they have any questions or difficulties they will call the office. Billing Type: Third-Party Bill - - -

## 2023-12-13 ENCOUNTER — APPOINTMENT (OUTPATIENT)
Dept: NEUROLOGY | Facility: CLINIC | Age: 53
End: 2023-12-13

## 2024-01-17 ENCOUNTER — APPOINTMENT (OUTPATIENT)
Dept: CARDIOLOGY | Facility: CLINIC | Age: 54
End: 2024-01-17
Payer: COMMERCIAL

## 2024-01-17 ENCOUNTER — NON-APPOINTMENT (OUTPATIENT)
Age: 54
End: 2024-01-17

## 2024-01-17 VITALS
OXYGEN SATURATION: 96 % | DIASTOLIC BLOOD PRESSURE: 69 MMHG | TEMPERATURE: 95.6 F | WEIGHT: 203 LBS | BODY MASS INDEX: 30.07 KG/M2 | SYSTOLIC BLOOD PRESSURE: 106 MMHG | HEIGHT: 69 IN | HEART RATE: 92 BPM

## 2024-01-17 PROCEDURE — 93000 ELECTROCARDIOGRAM COMPLETE: CPT

## 2024-01-17 PROCEDURE — 99212 OFFICE O/P EST SF 10 MIN: CPT | Mod: 25

## 2024-01-17 NOTE — REASON FOR VISIT
[FreeTextEntry1] : 52F with PMHx pre-diabetes - diet-controlled for evaluation of intermittent chain pain with exertion.  Walks for exercise, maintains HH diet No chronic conditions  No mediations  No family history history of CAD in first degree relatives.  ECG treadmill test: 9 mets, 1 mm horizontal/upsloping STD in multiple leads.  CTCA: minimal plaque in RCA, CAC 0

## 2024-01-17 NOTE — DISCUSSION/SUMMARY
[FreeTextEntry1] : 52F with PMHx pre-diabetes - diet-controlled for evaluation of intermittent chain pain with exertion Chest pain not related to CAD Recommend risk factor modification.  CAC 0- no indication for statin at this time.  Pre-diabetes noted, patient would like to discuss starting metformin. Recommend discussing with Endocrinology.   RTC as needed or in 1 year.

## 2024-03-21 ENCOUNTER — APPOINTMENT (OUTPATIENT)
Dept: ENDOCRINOLOGY | Facility: CLINIC | Age: 54
End: 2024-03-21
Payer: COMMERCIAL

## 2024-03-21 VITALS
DIASTOLIC BLOOD PRESSURE: 64 MMHG | RESPIRATION RATE: 16 BRPM | TEMPERATURE: 97.3 F | HEIGHT: 69 IN | OXYGEN SATURATION: 97 % | SYSTOLIC BLOOD PRESSURE: 107 MMHG | BODY MASS INDEX: 30.07 KG/M2 | HEART RATE: 92 BPM | WEIGHT: 203 LBS

## 2024-03-21 PROCEDURE — 99205 OFFICE O/P NEW HI 60 MIN: CPT

## 2024-03-21 NOTE — HISTORY OF PRESENT ILLNESS
[FreeTextEntry1] : HPI: 53-year-old female presenting to establish care for type 2 DM   PMHx: prediabetes   Allergies: NKDA     Diabetes History:   When and how diagnosed: Hx of prediabetes as high as 6.1%, brought it down to 5.6% after losing 30 pounds.  Type of Diabetes: prediabetes Hx of DKA or HHS? none   Current DM medication regimen: none  Fingerstick Monitoring: FSG -140 FSG PM FSG Bedtime Last HBA1C: 5.8% POCT HBA1C: 6.5%    LIFESTYLE FACTORS:  Eating patterns and weight history: intermittent fasting  Diet: Breakfast: water and tea  Lunch: 2pm:  almonds or salad  Dinner: 5pm- carbs, veggies, protein, oranges 3,  Snacks: rice cakes, cookies, mentos  Beverages: water, tea   Activity/Sleep: three time a week  Sleeps after midnight, sleeps less than 5 hours  Complications: Macrovascular- denies CVA/Stroke? CVD? PVD? ED?   Microvascular- denies Retinopathy? Nephropathy? Neuropathy?   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

## 2024-03-21 NOTE — PHYSICAL EXAM
[Alert] : alert [No Acute Distress] : no acute distress [Well Nourished] : well nourished [Normal Sclera/Conjunctiva] : normal sclera/conjunctiva [Well Developed] : well developed [No Proptosis] : no proptosis [EOMI] : extra ocular movement intact [Normal Oropharynx] : the oropharynx was normal [Thyroid Not Enlarged] : the thyroid was not enlarged [No Respiratory Distress] : no respiratory distress [No Thyroid Nodules] : no palpable thyroid nodules [No Accessory Muscle Use] : no accessory muscle use [Clear to Auscultation] : lungs were clear to auscultation bilaterally [Normal S1, S2] : normal S1 and S2 [Normal Rate] : heart rate was normal [Regular Rhythm] : with a regular rhythm [No Edema] : no peripheral edema [Pedal Pulses Normal] : the pedal pulses are present [Normal Bowel Sounds] : normal bowel sounds [Not Tender] : non-tender [Soft] : abdomen soft [Not Distended] : not distended [Normal Posterior Cervical Nodes] : no posterior cervical lymphadenopathy [Normal Anterior Cervical Nodes] : no anterior cervical lymphadenopathy [Spine Straight] : spine straight [No Spinal Tenderness] : no spinal tenderness [Normal Gait] : normal gait [No Stigmata of Cushings Syndrome] : no stigmata of Cushings Syndrome [Normal Strength/Tone] : muscle strength and tone were normal [No Rash] : no rash [Acanthosis Nigricans] : no acanthosis nigricans [Normal Reflexes] : deep tendon reflexes were 2+ and symmetric [No Tremors] : no tremors [Oriented x3] : oriented to person, place, and time [de-identified] : overweight

## 2024-03-21 NOTE — ASSESSMENT
[Diabetic Medications] : Risks and benefits of diabetic medications were discussed [FreeTextEntry1] : New onset DM: A1c: 6.5% today not on any medications would like to manage with diet and lifestyle changes prior to starting medications Advised that starting metformin in the setting of even prediabetes is helpful with managing insulin resistance Would like to follow up in 3 months for repeat A1c and then decide  - DIABETES EDUCATION: Extensive education about diabetes, hyperglycemia, hypoglycemia, glucose target ranges, dietary adherence to ADA diet, avoiding rice and sweets, eating fruits and fresh vegetables is highly recommended. Advise to increase the physical activity. Reviewed the importance of following up with outpatient endocrinology/ophthalmology and podiatry appointments discussed. Explained the definition of HBA1C and target range. Explained the complications of diabetes, including stroke, renal failure, and blindness Patient verbalized understanding and agrees with the plan

## 2024-06-26 ENCOUNTER — APPOINTMENT (OUTPATIENT)
Dept: ENDOCRINOLOGY | Facility: CLINIC | Age: 54
End: 2024-06-26
Payer: COMMERCIAL

## 2024-06-26 VITALS
HEART RATE: 88 BPM | BODY MASS INDEX: 29.18 KG/M2 | WEIGHT: 197 LBS | TEMPERATURE: 98 F | DIASTOLIC BLOOD PRESSURE: 69 MMHG | SYSTOLIC BLOOD PRESSURE: 105 MMHG | HEIGHT: 69 IN | OXYGEN SATURATION: 97 %

## 2024-06-26 DIAGNOSIS — E11.9 TYPE 2 DIABETES MELLITUS W/OUT COMPLICATIONS: ICD-10-CM

## 2024-06-26 DIAGNOSIS — E55.9 VITAMIN D DEFICIENCY, UNSPECIFIED: ICD-10-CM

## 2024-06-26 DIAGNOSIS — R73.03 PREDIABETES.: ICD-10-CM

## 2024-06-26 DIAGNOSIS — E78.5 HYPERLIPIDEMIA, UNSPECIFIED: ICD-10-CM

## 2024-06-26 LAB
GLUCOSE BLDC GLUCOMTR-MCNC: 139
HBA1C MFR BLD HPLC: 6.5

## 2024-06-26 PROCEDURE — 99214 OFFICE O/P EST MOD 30 MIN: CPT

## 2024-06-26 PROCEDURE — 82962 GLUCOSE BLOOD TEST: CPT

## 2024-06-26 PROCEDURE — 83036 HEMOGLOBIN GLYCOSYLATED A1C: CPT | Mod: QW

## 2024-06-28 LAB
25(OH)D3 SERPL-MCNC: 31.2 NG/ML
ALBUMIN SERPL ELPH-MCNC: 4.7 G/DL
ALP BLD-CCNC: 82 U/L
ALT SERPL-CCNC: 24 U/L
ANION GAP SERPL CALC-SCNC: 12 MMOL/L
AST SERPL-CCNC: 20 U/L
BILIRUB SERPL-MCNC: 1.4 MG/DL
BUN SERPL-MCNC: 11 MG/DL
CALCIUM SERPL-MCNC: 9.1 MG/DL
CHLORIDE SERPL-SCNC: 104 MMOL/L
CHOLEST SERPL-MCNC: 143 MG/DL
CHOLEST/HDLC SERPL: 3.2 RATIO
CO2 SERPL-SCNC: 22 MMOL/L
CREAT SERPL-MCNC: 0.68 MG/DL
EGFR: 104 ML/MIN/1.73M2
ESTIMATED AVERAGE GLUCOSE: 131 MG/DL
GLUCOSE SERPL-MCNC: 114 MG/DL
HBA1C MFR BLD HPLC: 6.2 %
HDLC SERPL-MCNC: 45 MG/DL
LDLC SERPL CALC-MCNC: 90 MG/DL
NONHDLC SERPL-MCNC: 99 MG/DL
POTASSIUM SERPL-SCNC: 4.1 MMOL/L
PROT SERPL-MCNC: 7.4 G/DL
SODIUM SERPL-SCNC: 139 MMOL/L
TRIGL SERPL-MCNC: 38 MG/DL
TSH SERPL-ACNC: 0.66 UIU/ML

## 2024-09-25 ENCOUNTER — APPOINTMENT (OUTPATIENT)
Dept: ENDOCRINOLOGY | Facility: CLINIC | Age: 54
End: 2024-09-25
Payer: COMMERCIAL

## 2024-09-25 VITALS
BODY MASS INDEX: 29.62 KG/M2 | OXYGEN SATURATION: 98 % | HEIGHT: 69 IN | RESPIRATION RATE: 16 BRPM | HEART RATE: 63 BPM | WEIGHT: 200 LBS | TEMPERATURE: 97.5 F | DIASTOLIC BLOOD PRESSURE: 70 MMHG | SYSTOLIC BLOOD PRESSURE: 109 MMHG

## 2024-09-25 DIAGNOSIS — R73.03 PREDIABETES.: ICD-10-CM

## 2024-09-25 DIAGNOSIS — E78.5 HYPERLIPIDEMIA, UNSPECIFIED: ICD-10-CM

## 2024-09-25 LAB
GLUCOSE BLDC GLUCOMTR-MCNC: 117
HBA1C MFR BLD HPLC: 6.5

## 2024-09-25 PROCEDURE — 99214 OFFICE O/P EST MOD 30 MIN: CPT

## 2024-09-25 PROCEDURE — 83036 HEMOGLOBIN GLYCOSYLATED A1C: CPT | Mod: QW

## 2024-09-25 PROCEDURE — 82962 GLUCOSE BLOOD TEST: CPT

## 2024-11-20 ENCOUNTER — EMERGENCY (EMERGENCY)
Facility: HOSPITAL | Age: 54
LOS: 1 days | Discharge: HOME | End: 2024-11-20
Admitting: EMERGENCY MEDICINE
Payer: COMMERCIAL

## 2024-11-20 VITALS
SYSTOLIC BLOOD PRESSURE: 109 MMHG | WEIGHT: 199.96 LBS | RESPIRATION RATE: 18 BRPM | TEMPERATURE: 98 F | HEART RATE: 81 BPM | DIASTOLIC BLOOD PRESSURE: 70 MMHG | HEIGHT: 69 IN | OXYGEN SATURATION: 98 %

## 2024-11-20 PROCEDURE — 93010 ELECTROCARDIOGRAM REPORT: CPT

## 2024-11-20 PROCEDURE — 99284 EMERGENCY DEPT VISIT MOD MDM: CPT

## 2024-11-21 VITALS
OXYGEN SATURATION: 98 % | SYSTOLIC BLOOD PRESSURE: 113 MMHG | RESPIRATION RATE: 18 BRPM | HEART RATE: 80 BPM | TEMPERATURE: 98 F | DIASTOLIC BLOOD PRESSURE: 60 MMHG

## 2024-11-21 LAB
FLUAV AG NPH QL: SIGNIFICANT CHANGE UP
FLUBV AG NPH QL: SIGNIFICANT CHANGE UP
RSV RNA NPH QL NAA+NON-PROBE: SIGNIFICANT CHANGE UP
SARS-COV-2 RNA SPEC QL NAA+PROBE: SIGNIFICANT CHANGE UP

## 2024-11-21 PROCEDURE — 71046 X-RAY EXAM CHEST 2 VIEWS: CPT | Mod: 26

## 2024-11-21 RX ORDER — BENZONATATE 100 MG
1 CAPSULE ORAL
Qty: 18 | Refills: 0
Start: 2024-11-21

## 2024-11-21 RX ORDER — PREDNISONE 20 MG/1
2 TABLET ORAL
Qty: 10 | Refills: 0
Start: 2024-11-21 | End: 2024-11-25

## 2024-11-21 RX ORDER — BENZONATATE 100 MG
1 CAPSULE ORAL
Qty: 10 | Refills: 0
Start: 2024-11-21 | End: 2024-11-25

## 2024-12-18 ENCOUNTER — RESULT CHARGE (OUTPATIENT)
Age: 54
End: 2024-12-18

## 2024-12-18 ENCOUNTER — APPOINTMENT (OUTPATIENT)
Dept: ENDOCRINOLOGY | Facility: CLINIC | Age: 54
End: 2024-12-18
Payer: COMMERCIAL

## 2024-12-18 VITALS
SYSTOLIC BLOOD PRESSURE: 101 MMHG | DIASTOLIC BLOOD PRESSURE: 67 MMHG | WEIGHT: 195 LBS | RESPIRATION RATE: 16 BRPM | HEIGHT: 69 IN | HEART RATE: 81 BPM | OXYGEN SATURATION: 98 % | TEMPERATURE: 97.3 F | BODY MASS INDEX: 28.88 KG/M2

## 2024-12-18 DIAGNOSIS — E11.9 TYPE 2 DIABETES MELLITUS W/OUT COMPLICATIONS: ICD-10-CM

## 2024-12-18 PROCEDURE — 83036 HEMOGLOBIN GLYCOSYLATED A1C: CPT | Mod: QW

## 2024-12-18 PROCEDURE — 99214 OFFICE O/P EST MOD 30 MIN: CPT

## 2024-12-18 PROCEDURE — 82962 GLUCOSE BLOOD TEST: CPT

## 2024-12-19 LAB
CREAT SPEC-SCNC: 143 MG/DL
MICROALBUMIN 24H UR DL<=1MG/L-MCNC: <1.2 MG/DL
MICROALBUMIN/CREAT 24H UR-RTO: NORMAL MG/G

## 2024-12-19 RX ORDER — TIRZEPATIDE 2.5 MG/.5ML
2.5 INJECTION, SOLUTION SUBCUTANEOUS
Qty: 1 | Refills: 3 | Status: ACTIVE | COMMUNITY
Start: 2024-12-19 | End: 1900-01-01

## 2025-01-22 ENCOUNTER — APPOINTMENT (OUTPATIENT)
Dept: CARDIOLOGY | Facility: CLINIC | Age: 55
End: 2025-01-22
Payer: COMMERCIAL

## 2025-01-22 ENCOUNTER — NON-APPOINTMENT (OUTPATIENT)
Age: 55
End: 2025-01-22

## 2025-01-22 VITALS
BODY MASS INDEX: 28.58 KG/M2 | HEART RATE: 89 BPM | TEMPERATURE: 97.7 F | RESPIRATION RATE: 16 BRPM | WEIGHT: 193 LBS | HEIGHT: 69 IN | OXYGEN SATURATION: 97 % | SYSTOLIC BLOOD PRESSURE: 100 MMHG | DIASTOLIC BLOOD PRESSURE: 66 MMHG

## 2025-01-22 PROCEDURE — G2211 COMPLEX E/M VISIT ADD ON: CPT | Mod: NC

## 2025-01-22 PROCEDURE — 99214 OFFICE O/P EST MOD 30 MIN: CPT

## 2025-01-22 PROCEDURE — 93000 ELECTROCARDIOGRAM COMPLETE: CPT

## 2025-01-24 LAB
CHOLEST SERPL-MCNC: 121 MG/DL
HDLC SERPL-MCNC: 40 MG/DL
LDLC SERPL CALC-MCNC: 69 MG/DL
NONHDLC SERPL-MCNC: 82 MG/DL
TRIGL SERPL-MCNC: 57 MG/DL

## 2025-01-28 RX ORDER — ONDANSETRON 4 MG/1
4 TABLET, ORALLY DISINTEGRATING ORAL EVERY 8 HOURS
Qty: 8 | Refills: 3 | Status: ACTIVE | COMMUNITY
Start: 2025-01-28 | End: 1900-01-01

## 2025-02-21 ENCOUNTER — APPOINTMENT (OUTPATIENT)
Dept: VASCULAR SURGERY | Facility: CLINIC | Age: 55
End: 2025-02-21
Payer: COMMERCIAL

## 2025-02-21 VITALS
TEMPERATURE: 98 F | HEART RATE: 86 BPM | BODY MASS INDEX: 27.99 KG/M2 | HEIGHT: 69 IN | OXYGEN SATURATION: 98 % | WEIGHT: 189 LBS | DIASTOLIC BLOOD PRESSURE: 72 MMHG | SYSTOLIC BLOOD PRESSURE: 113 MMHG | RESPIRATION RATE: 16 BRPM

## 2025-02-21 PROCEDURE — 99203 OFFICE O/P NEW LOW 30 MIN: CPT

## 2025-02-21 PROCEDURE — 93970 EXTREMITY STUDY: CPT

## 2025-04-09 ENCOUNTER — APPOINTMENT (OUTPATIENT)
Dept: ENDOCRINOLOGY | Facility: CLINIC | Age: 55
End: 2025-04-09
Payer: COMMERCIAL

## 2025-04-09 VITALS
HEART RATE: 90 BPM | BODY MASS INDEX: 27.25 KG/M2 | DIASTOLIC BLOOD PRESSURE: 69 MMHG | OXYGEN SATURATION: 96 % | HEIGHT: 69 IN | TEMPERATURE: 97.6 F | SYSTOLIC BLOOD PRESSURE: 109 MMHG | WEIGHT: 184 LBS

## 2025-04-09 DIAGNOSIS — E78.5 HYPERLIPIDEMIA, UNSPECIFIED: ICD-10-CM

## 2025-04-09 DIAGNOSIS — E11.9 TYPE 2 DIABETES MELLITUS W/OUT COMPLICATIONS: ICD-10-CM

## 2025-04-09 PROCEDURE — 82962 GLUCOSE BLOOD TEST: CPT

## 2025-04-09 PROCEDURE — 83036 HEMOGLOBIN GLYCOSYLATED A1C: CPT | Mod: QW

## 2025-04-09 PROCEDURE — 99214 OFFICE O/P EST MOD 30 MIN: CPT

## 2025-04-11 LAB
GLUCOSE BLDC GLUCOMTR-MCNC: 101
HBA1C MFR BLD HPLC: 5.2

## 2025-04-23 ENCOUNTER — APPOINTMENT (OUTPATIENT)
Dept: CARDIOLOGY | Facility: CLINIC | Age: 55
End: 2025-04-23
Payer: COMMERCIAL

## 2025-04-23 ENCOUNTER — NON-APPOINTMENT (OUTPATIENT)
Age: 55
End: 2025-04-23

## 2025-04-23 VITALS
WEIGHT: 186 LBS | HEART RATE: 76 BPM | BODY MASS INDEX: 27.55 KG/M2 | TEMPERATURE: 97.5 F | OXYGEN SATURATION: 97 % | RESPIRATION RATE: 16 BRPM | SYSTOLIC BLOOD PRESSURE: 107 MMHG | HEIGHT: 69 IN | DIASTOLIC BLOOD PRESSURE: 70 MMHG

## 2025-04-23 DIAGNOSIS — R07.9 CHEST PAIN, UNSPECIFIED: ICD-10-CM

## 2025-04-23 DIAGNOSIS — E11.9 TYPE 2 DIABETES MELLITUS W/OUT COMPLICATIONS: ICD-10-CM

## 2025-04-23 PROCEDURE — 99214 OFFICE O/P EST MOD 30 MIN: CPT | Mod: 25

## 2025-04-23 PROCEDURE — 93000 ELECTROCARDIOGRAM COMPLETE: CPT

## 2025-06-05 ENCOUNTER — RX RENEWAL (OUTPATIENT)
Age: 55
End: 2025-06-05

## 2025-06-26 ENCOUNTER — APPOINTMENT (OUTPATIENT)
Dept: ENDOCRINOLOGY | Facility: CLINIC | Age: 55
End: 2025-06-26

## 2025-06-30 ENCOUNTER — APPOINTMENT (OUTPATIENT)
Dept: ENDOCRINOLOGY | Facility: CLINIC | Age: 55
End: 2025-06-30

## 2025-06-30 RX ORDER — TIRZEPATIDE 5 MG/.5ML
5 INJECTION, SOLUTION SUBCUTANEOUS
Qty: 3 | Refills: 3 | Status: DISCONTINUED | COMMUNITY
Start: 2025-06-30 | End: 2025-06-30

## 2025-08-07 ENCOUNTER — APPOINTMENT (OUTPATIENT)
Dept: ENDOCRINOLOGY | Facility: CLINIC | Age: 55
End: 2025-08-07
Payer: COMMERCIAL

## 2025-08-07 ENCOUNTER — NON-APPOINTMENT (OUTPATIENT)
Age: 55
End: 2025-08-07

## 2025-08-07 VITALS
HEART RATE: 86 BPM | OXYGEN SATURATION: 98 % | DIASTOLIC BLOOD PRESSURE: 64 MMHG | HEIGHT: 69 IN | WEIGHT: 185 LBS | BODY MASS INDEX: 27.4 KG/M2 | SYSTOLIC BLOOD PRESSURE: 106 MMHG

## 2025-08-07 DIAGNOSIS — E78.5 HYPERLIPIDEMIA, UNSPECIFIED: ICD-10-CM

## 2025-08-07 DIAGNOSIS — E11.9 TYPE 2 DIABETES MELLITUS W/OUT COMPLICATIONS: ICD-10-CM

## 2025-08-07 LAB
GLUCOSE BLDC GLUCOMTR-MCNC: 95
HBA1C MFR BLD HPLC: 5.1

## 2025-08-07 PROCEDURE — 36415 COLL VENOUS BLD VENIPUNCTURE: CPT

## 2025-08-07 PROCEDURE — 99214 OFFICE O/P EST MOD 30 MIN: CPT

## 2025-08-08 LAB
25(OH)D3 SERPL-MCNC: 40.5 NG/ML
ALBUMIN SERPL ELPH-MCNC: 4.3 G/DL
ALBUMIN, RANDOM URINE: <1.2 MG/DL
ALP BLD-CCNC: 76 U/L
ALT SERPL-CCNC: 28 U/L
ANION GAP SERPL CALC-SCNC: 13 MMOL/L
AST SERPL-CCNC: 31 U/L
BILIRUB SERPL-MCNC: 1.4 MG/DL
BUN SERPL-MCNC: 15 MG/DL
CALCIUM SERPL-MCNC: 9.2 MG/DL
CHLORIDE SERPL-SCNC: 105 MMOL/L
CHOLEST SERPL-MCNC: 123 MG/DL
CO2 SERPL-SCNC: 23 MMOL/L
CREAT SERPL-MCNC: 0.85 MG/DL
CREAT SPEC-SCNC: 117 MG/DL
EGFRCR SERPLBLD CKD-EPI 2021: 81 ML/MIN/1.73M2
GLUCOSE SERPL-MCNC: 77 MG/DL
HDLC SERPL-MCNC: 41 MG/DL
LDLC SERPL-MCNC: 64 MG/DL
MICROALBUMIN/CREAT 24H UR-RTO: NORMAL MG/G
NONHDLC SERPL-MCNC: 83 MG/DL
POTASSIUM SERPL-SCNC: 4 MMOL/L
PROT SERPL-MCNC: 6.8 G/DL
SODIUM SERPL-SCNC: 140 MMOL/L
TRIGL SERPL-MCNC: 100 MG/DL
TSH SERPL-ACNC: 0.74 UIU/ML

## 2025-08-21 ENCOUNTER — TRANSCRIPTION ENCOUNTER (OUTPATIENT)
Age: 55
End: 2025-08-21